# Patient Record
Sex: MALE | Race: WHITE | NOT HISPANIC OR LATINO | Employment: OTHER | URBAN - METROPOLITAN AREA
[De-identification: names, ages, dates, MRNs, and addresses within clinical notes are randomized per-mention and may not be internally consistent; named-entity substitution may affect disease eponyms.]

---

## 2021-03-21 ENCOUNTER — HOSPITAL ENCOUNTER (EMERGENCY)
Facility: HOSPITAL | Age: 46
Discharge: HOME | End: 2021-03-21
Attending: EMERGENCY MEDICINE
Payer: MEDICARE

## 2021-03-21 VITALS
DIASTOLIC BLOOD PRESSURE: 95 MMHG | BODY MASS INDEX: 32.85 KG/M2 | HEIGHT: 74 IN | RESPIRATION RATE: 16 BRPM | HEART RATE: 63 BPM | TEMPERATURE: 98 F | OXYGEN SATURATION: 97 % | SYSTOLIC BLOOD PRESSURE: 143 MMHG | WEIGHT: 256 LBS

## 2021-03-21 DIAGNOSIS — S83.91XD SPRAIN OF RIGHT KNEE, UNSPECIFIED LIGAMENT, SUBSEQUENT ENCOUNTER: Primary | ICD-10-CM

## 2021-03-21 DIAGNOSIS — M25.569 KNEE PAIN: ICD-10-CM

## 2021-03-21 PROCEDURE — 99283 EMERGENCY DEPT VISIT LOW MDM: CPT | Mod: 25

## 2021-10-29 ENCOUNTER — TELEPHONE (OUTPATIENT)
Dept: ORTHOPEDICS | Facility: CLINIC | Age: 46
End: 2021-10-29
Payer: MEDICARE

## 2021-10-29 DIAGNOSIS — M51.36 DDD (DEGENERATIVE DISC DISEASE), LUMBAR: Primary | ICD-10-CM

## 2021-11-01 ENCOUNTER — TELEPHONE (OUTPATIENT)
Dept: PAIN MEDICINE | Facility: CLINIC | Age: 46
End: 2021-11-01
Payer: MEDICARE

## 2021-11-01 ENCOUNTER — OFFICE VISIT (OUTPATIENT)
Dept: INTERNAL MEDICINE | Facility: CLINIC | Age: 46
End: 2021-11-01
Payer: MEDICARE

## 2021-11-01 VITALS
OXYGEN SATURATION: 97 % | DIASTOLIC BLOOD PRESSURE: 88 MMHG | HEIGHT: 74 IN | BODY MASS INDEX: 34.55 KG/M2 | TEMPERATURE: 99 F | HEART RATE: 85 BPM | SYSTOLIC BLOOD PRESSURE: 120 MMHG | WEIGHT: 269.19 LBS

## 2021-11-01 DIAGNOSIS — Z00.00 ANNUAL PHYSICAL EXAM: Primary | ICD-10-CM

## 2021-11-01 DIAGNOSIS — Z12.5 SCREENING FOR PROSTATE CANCER: ICD-10-CM

## 2021-11-01 DIAGNOSIS — Z11.59 NEED FOR HEPATITIS C SCREENING TEST: ICD-10-CM

## 2021-11-01 DIAGNOSIS — Z98.890 STATUS POST LUMBAR SPINE OPERATION: ICD-10-CM

## 2021-11-01 DIAGNOSIS — K76.9 LIVER LESION: ICD-10-CM

## 2021-11-01 DIAGNOSIS — F17.210 CIGARETTE NICOTINE DEPENDENCE WITHOUT COMPLICATION: ICD-10-CM

## 2021-11-01 DIAGNOSIS — Z79.899 HIGH RISK MEDICATION USE: ICD-10-CM

## 2021-11-01 PROCEDURE — 99999 PR PBB SHADOW E&M-EST. PATIENT-LVL III: CPT | Mod: PBBFAC,HCNC,, | Performed by: FAMILY MEDICINE

## 2021-11-01 PROCEDURE — 3079F PR MOST RECENT DIASTOLIC BLOOD PRESSURE 80-89 MM HG: ICD-10-PCS | Mod: HCNC,CPTII,S$GLB, | Performed by: FAMILY MEDICINE

## 2021-11-01 PROCEDURE — 99204 OFFICE O/P NEW MOD 45 MIN: CPT | Mod: HCNC,S$GLB,, | Performed by: FAMILY MEDICINE

## 2021-11-01 PROCEDURE — 99999 PR PBB SHADOW E&M-EST. PATIENT-LVL III: ICD-10-PCS | Mod: PBBFAC,HCNC,, | Performed by: FAMILY MEDICINE

## 2021-11-01 PROCEDURE — 3079F DIAST BP 80-89 MM HG: CPT | Mod: HCNC,CPTII,S$GLB, | Performed by: FAMILY MEDICINE

## 2021-11-01 PROCEDURE — 1159F PR MEDICATION LIST DOCUMENTED IN MEDICAL RECORD: ICD-10-PCS | Mod: HCNC,CPTII,S$GLB, | Performed by: FAMILY MEDICINE

## 2021-11-01 PROCEDURE — 1159F MED LIST DOCD IN RCRD: CPT | Mod: HCNC,CPTII,S$GLB, | Performed by: FAMILY MEDICINE

## 2021-11-01 PROCEDURE — 3074F SYST BP LT 130 MM HG: CPT | Mod: HCNC,CPTII,S$GLB, | Performed by: FAMILY MEDICINE

## 2021-11-01 PROCEDURE — 3074F PR MOST RECENT SYSTOLIC BLOOD PRESSURE < 130 MM HG: ICD-10-PCS | Mod: HCNC,CPTII,S$GLB, | Performed by: FAMILY MEDICINE

## 2021-11-01 PROCEDURE — 3008F PR BODY MASS INDEX (BMI) DOCUMENTED: ICD-10-PCS | Mod: HCNC,CPTII,S$GLB, | Performed by: FAMILY MEDICINE

## 2021-11-01 PROCEDURE — 3008F BODY MASS INDEX DOCD: CPT | Mod: HCNC,CPTII,S$GLB, | Performed by: FAMILY MEDICINE

## 2021-11-01 PROCEDURE — 99204 PR OFFICE/OUTPT VISIT, NEW, LEVL IV, 45-59 MIN: ICD-10-PCS | Mod: HCNC,S$GLB,, | Performed by: FAMILY MEDICINE

## 2021-11-05 ENCOUNTER — HOSPITAL ENCOUNTER (OUTPATIENT)
Dept: RADIOLOGY | Facility: HOSPITAL | Age: 46
Discharge: HOME | End: 2021-11-05
Attending: FAMILY MEDICINE
Payer: MEDICARE

## 2021-11-05 DIAGNOSIS — K76.9 LIVER LESION: ICD-10-CM

## 2021-11-05 PROCEDURE — 76705 ECHO EXAM OF ABDOMEN: CPT | Mod: 26,,, | Performed by: RADIOLOGY

## 2021-11-05 PROCEDURE — 76705 US ABDOMEN LIMITED_LIVER: ICD-10-PCS | Mod: 26,,, | Performed by: RADIOLOGY

## 2021-11-05 PROCEDURE — 76705 ECHO EXAM OF ABDOMEN: CPT | Mod: TC

## 2021-12-16 ENCOUNTER — TELEPHONE (OUTPATIENT)
Dept: PAIN MEDICINE | Facility: CLINIC | Age: 46
End: 2021-12-16
Payer: MEDICARE

## 2021-12-17 ENCOUNTER — OFFICE VISIT (OUTPATIENT)
Dept: PAIN MEDICINE | Facility: CLINIC | Age: 46
End: 2021-12-17
Payer: MEDICARE

## 2021-12-17 ENCOUNTER — HOSPITAL ENCOUNTER (OUTPATIENT)
Dept: RADIOLOGY | Facility: HOSPITAL | Age: 46
Discharge: HOME | End: 2021-12-17
Attending: ORTHOPAEDIC SURGERY
Payer: MEDICARE

## 2021-12-17 VITALS
BODY MASS INDEX: 31.35 KG/M2 | HEIGHT: 74 IN | HEART RATE: 87 BPM | SYSTOLIC BLOOD PRESSURE: 139 MMHG | RESPIRATION RATE: 17 BRPM | WEIGHT: 244.25 LBS | DIASTOLIC BLOOD PRESSURE: 90 MMHG

## 2021-12-17 DIAGNOSIS — M47.816 LUMBAR SPONDYLOSIS: ICD-10-CM

## 2021-12-17 DIAGNOSIS — M51.36 DDD (DEGENERATIVE DISC DISEASE), LUMBAR: ICD-10-CM

## 2021-12-17 DIAGNOSIS — Z98.890 STATUS POST LUMBAR SPINE OPERATION: ICD-10-CM

## 2021-12-17 DIAGNOSIS — M47.816 LUMBAR FACET ARTHROPATHY: ICD-10-CM

## 2021-12-17 DIAGNOSIS — M47.816 LUMBAR FACET ARTHROPATHY: Primary | ICD-10-CM

## 2021-12-17 PROCEDURE — 99999 PR PBB SHADOW E&M-EST. PATIENT-LVL III: ICD-10-PCS | Mod: PBBFAC,HCNC,, | Performed by: ANESTHESIOLOGY

## 2021-12-17 PROCEDURE — 99204 OFFICE O/P NEW MOD 45 MIN: CPT | Mod: HCNC,S$GLB,, | Performed by: ANESTHESIOLOGY

## 2021-12-17 PROCEDURE — 72110 XR LUMBAR SPINE 5 VIEW WITH FLEX AND EXT: ICD-10-PCS | Mod: 26,HCNC,, | Performed by: RADIOLOGY

## 2021-12-17 PROCEDURE — 99204 PR OFFICE/OUTPT VISIT, NEW, LEVL IV, 45-59 MIN: ICD-10-PCS | Mod: HCNC,S$GLB,, | Performed by: ANESTHESIOLOGY

## 2021-12-17 PROCEDURE — 72110 X-RAY EXAM L-2 SPINE 4/>VWS: CPT | Mod: TC,HCNC

## 2021-12-17 PROCEDURE — 99999 PR PBB SHADOW E&M-EST. PATIENT-LVL III: CPT | Mod: PBBFAC,HCNC,, | Performed by: ANESTHESIOLOGY

## 2021-12-17 PROCEDURE — 72110 X-RAY EXAM L-2 SPINE 4/>VWS: CPT | Mod: 26,HCNC,, | Performed by: RADIOLOGY

## 2022-01-31 PROBLEM — Z00.00 ANNUAL PHYSICAL EXAM: Status: RESOLVED | Noted: 2021-11-01 | Resolved: 2022-01-31

## 2022-03-02 ENCOUNTER — OFFICE VISIT (OUTPATIENT)
Dept: INTERNAL MEDICINE | Facility: CLINIC | Age: 47
End: 2022-03-02
Payer: MEDICARE

## 2022-03-02 ENCOUNTER — LAB VISIT (OUTPATIENT)
Dept: LAB | Facility: HOSPITAL | Age: 47
End: 2022-03-02
Attending: FAMILY MEDICINE
Payer: MEDICARE

## 2022-03-02 VITALS
WEIGHT: 258.63 LBS | HEART RATE: 72 BPM | RESPIRATION RATE: 18 BRPM | BODY MASS INDEX: 33.19 KG/M2 | HEIGHT: 74 IN | OXYGEN SATURATION: 95 % | DIASTOLIC BLOOD PRESSURE: 82 MMHG | SYSTOLIC BLOOD PRESSURE: 134 MMHG | TEMPERATURE: 97 F

## 2022-03-02 DIAGNOSIS — E78.1 HYPERTRIGLYCERIDEMIA: ICD-10-CM

## 2022-03-02 DIAGNOSIS — K74.60 HEPATIC CIRRHOSIS, UNSPECIFIED HEPATIC CIRRHOSIS TYPE, UNSPECIFIED WHETHER ASCITES PRESENT: ICD-10-CM

## 2022-03-02 DIAGNOSIS — K74.60 HEPATIC CIRRHOSIS, UNSPECIFIED HEPATIC CIRRHOSIS TYPE, UNSPECIFIED WHETHER ASCITES PRESENT: Primary | ICD-10-CM

## 2022-03-02 LAB
ESTIMATED AVG GLUCOSE: 114 MG/DL (ref 68–131)
HBA1C MFR BLD: 5.6 % (ref 4–5.6)

## 2022-03-02 PROCEDURE — 3075F SYST BP GE 130 - 139MM HG: CPT | Mod: CPTII,S$GLB,, | Performed by: FAMILY MEDICINE

## 2022-03-02 PROCEDURE — 3075F PR MOST RECENT SYSTOLIC BLOOD PRESS GE 130-139MM HG: ICD-10-PCS | Mod: CPTII,S$GLB,, | Performed by: FAMILY MEDICINE

## 2022-03-02 PROCEDURE — 83036 HEMOGLOBIN GLYCOSYLATED A1C: CPT | Performed by: FAMILY MEDICINE

## 2022-03-02 PROCEDURE — 3008F PR BODY MASS INDEX (BMI) DOCUMENTED: ICD-10-PCS | Mod: CPTII,S$GLB,, | Performed by: FAMILY MEDICINE

## 2022-03-02 PROCEDURE — 3008F BODY MASS INDEX DOCD: CPT | Mod: CPTII,S$GLB,, | Performed by: FAMILY MEDICINE

## 2022-03-02 PROCEDURE — 3079F PR MOST RECENT DIASTOLIC BLOOD PRESSURE 80-89 MM HG: ICD-10-PCS | Mod: CPTII,S$GLB,, | Performed by: FAMILY MEDICINE

## 2022-03-02 PROCEDURE — 99999 PR PBB SHADOW E&M-EST. PATIENT-LVL III: CPT | Mod: PBBFAC,,, | Performed by: FAMILY MEDICINE

## 2022-03-02 PROCEDURE — 99499 UNLISTED E&M SERVICE: CPT | Mod: S$GLB,,, | Performed by: FAMILY MEDICINE

## 2022-03-02 PROCEDURE — 99499 RISK ADDL DX/OHS AUDIT: ICD-10-PCS | Mod: S$GLB,,, | Performed by: FAMILY MEDICINE

## 2022-03-02 PROCEDURE — 99214 PR OFFICE/OUTPT VISIT, EST, LEVL IV, 30-39 MIN: ICD-10-PCS | Mod: S$GLB,,, | Performed by: FAMILY MEDICINE

## 2022-03-02 PROCEDURE — 1159F PR MEDICATION LIST DOCUMENTED IN MEDICAL RECORD: ICD-10-PCS | Mod: CPTII,S$GLB,, | Performed by: FAMILY MEDICINE

## 2022-03-02 PROCEDURE — 86706 HEP B SURFACE ANTIBODY: CPT | Performed by: FAMILY MEDICINE

## 2022-03-02 PROCEDURE — 99214 OFFICE O/P EST MOD 30 MIN: CPT | Mod: S$GLB,,, | Performed by: FAMILY MEDICINE

## 2022-03-02 PROCEDURE — 99999 PR PBB SHADOW E&M-EST. PATIENT-LVL III: ICD-10-PCS | Mod: PBBFAC,,, | Performed by: FAMILY MEDICINE

## 2022-03-02 PROCEDURE — 1159F MED LIST DOCD IN RCRD: CPT | Mod: CPTII,S$GLB,, | Performed by: FAMILY MEDICINE

## 2022-03-02 PROCEDURE — 3079F DIAST BP 80-89 MM HG: CPT | Mod: CPTII,S$GLB,, | Performed by: FAMILY MEDICINE

## 2022-03-02 NOTE — PROGRESS NOTES
Subjective:       Patient ID: Jean Marie Lester is a 46 y.o. male.    Chief Complaint: Follow-up    Three-month follow-up.  He has been followed by pain management for chronic back pain.  Recent lab mildly elevated glucose and triglyceride.  He improved his diet since lab was done.  Ultrasound of the liver suggestive cirrhosis.  Hepatitis C test was negative.  Denies nausea vomiting abdominal pain dark urine.  He currently is not drinking alcohol.  He reports that years ago he drink alcohol in excess.    Review of Systems   Constitutional: Negative for activity change, appetite change, chills and fever.   Respiratory: Negative for cough, chest tightness, shortness of breath and wheezing.    Cardiovascular: Negative for chest pain, palpitations and leg swelling.   Gastrointestinal: Negative for abdominal distention, abdominal pain, constipation, diarrhea, nausea and vomiting.   Endocrine: Negative for polydipsia and polyuria.   Musculoskeletal: Positive for back pain.       Objective:      Physical Exam  Constitutional:       General: He is not in acute distress.     Appearance: He is not ill-appearing or diaphoretic.   Cardiovascular:      Rate and Rhythm: Normal rate and regular rhythm.      Heart sounds: No murmur heard.    No gallop.   Pulmonary:      Effort: Pulmonary effort is normal. No respiratory distress.      Breath sounds: No wheezing, rhonchi or rales.   Abdominal:      General: There is no distension.      Palpations: Abdomen is soft. There is no mass.      Tenderness: There is no abdominal tenderness.   Lymphadenopathy:      Cervical: No cervical adenopathy.   Neurological:      Mental Status: He is alert and oriented to person, place, and time.         Lab Visit on 11/05/2021   Component Date Value Ref Range Status    WBC 11/05/2021 11.59  3.90 - 12.70 K/uL Final    RBC 11/05/2021 5.95  4.60 - 6.20 M/uL Final    Hemoglobin 11/05/2021 17.5  14.0 - 18.0 g/dL Final    Hematocrit 11/05/2021 55.2 (A)  40.0 - 54.0 % Final    MCV 11/05/2021 93  82 - 98 fL Final    MCH 11/05/2021 29.4  27.0 - 31.0 pg Final    MCHC 11/05/2021 31.7 (A) 32.0 - 36.0 g/dL Final    RDW 11/05/2021 13.3  11.5 - 14.5 % Final    Platelets 11/05/2021 292  150 - 450 K/uL Final    MPV 11/05/2021 10.2  9.2 - 12.9 fL Final    Immature Granulocytes 11/05/2021 0.3  0.0 - 0.5 % Final    Gran # (ANC) 11/05/2021 7.0  1.8 - 7.7 K/uL Final    Immature Grans (Abs) 11/05/2021 0.04  0.00 - 0.04 K/uL Final    Lymph # 11/05/2021 3.6  1.0 - 4.8 K/uL Final    Mono # 11/05/2021 0.7  0.3 - 1.0 K/uL Final    Eos # 11/05/2021 0.2  0.0 - 0.5 K/uL Final    Baso # 11/05/2021 0.08  0.00 - 0.20 K/uL Final    nRBC 11/05/2021 0  0 /100 WBC Final    Gran % 11/05/2021 60.3  38.0 - 73.0 % Final    Lymph % 11/05/2021 30.9  18.0 - 48.0 % Final    Mono % 11/05/2021 6.3  4.0 - 15.0 % Final    Eosinophil % 11/05/2021 1.5  0.0 - 8.0 % Final    Basophil % 11/05/2021 0.7  0.0 - 1.9 % Final    Differential Method 11/05/2021 Automated   Final    Sodium 11/05/2021 139  136 - 145 mmol/L Final    Potassium 11/05/2021 4.8  3.5 - 5.1 mmol/L Final    Chloride 11/05/2021 104  95 - 110 mmol/L Final    CO2 11/05/2021 26  23 - 29 mmol/L Final    Glucose 11/05/2021 114 (A) 70 - 110 mg/dL Final    BUN 11/05/2021 10  6 - 20 mg/dL Final    Creatinine 11/05/2021 1.0  0.5 - 1.4 mg/dL Final    Calcium 11/05/2021 9.8  8.7 - 10.5 mg/dL Final    Total Protein 11/05/2021 8.0  6.0 - 8.4 g/dL Final    Albumin 11/05/2021 4.1  3.5 - 5.2 g/dL Final    Total Bilirubin 11/05/2021 0.6  0.1 - 1.0 mg/dL Final    Alkaline Phosphatase 11/05/2021 61  55 - 135 U/L Final    AST 11/05/2021 18  10 - 40 U/L Final    ALT 11/05/2021 24  10 - 44 U/L Final    Anion Gap 11/05/2021 9  8 - 16 mmol/L Final    eGFR if African American 11/05/2021 >60.0  >60 mL/min/1.73 m^2 Final    eGFR if non African American 11/05/2021 >60.0  >60 mL/min/1.73 m^2 Final    Cholesterol 11/05/2021 199  120 - 199  mg/dL Final    Triglycerides 11/05/2021 182 (A) 30 - 150 mg/dL Final    HDL 11/05/2021 35 (A) 40 - 75 mg/dL Final    LDL Cholesterol 11/05/2021 127.6  63.0 - 159.0 mg/dL Final    HDL/Cholesterol Ratio 11/05/2021 17.6 (A) 20.0 - 50.0 % Final    Total Cholesterol/HDL Ratio 11/05/2021 5.7 (A) 2.0 - 5.0 Final    Non-HDL Cholesterol 11/05/2021 164  mg/dL Final    TSH 11/05/2021 2.556  0.400 - 4.000 uIU/mL Final    Hepatitis C Ab 11/05/2021 Negative  Negative Final     Assessment:       1. Hepatic cirrhosis, unspecified hepatic cirrhosis type, unspecified whether ascites present    2. Hypertriglyceridemia        Plan:     A1c hepatitis b antibody ordered.  GI referral.  Follow-up in 3 months.    Hepatic cirrhosis, unspecified hepatic cirrhosis type, unspecified whether ascites present  -     Ambulatory referral/consult to Gastroenterology; Future; Expected date: 03/09/2022  -     Hepatitis B Surface Antibody, Qual/Quant; Future; Expected date: 03/02/2022    Hypertriglyceridemia  -     Hemoglobin A1C; Future; Expected date: 03/02/2022

## 2022-03-05 LAB
HBV SURFACE AB SER QL IA: NEGATIVE
HBV SURFACE AB SERPL IA-ACNC: <3 MIU/ML

## 2022-03-08 ENCOUNTER — TELEPHONE (OUTPATIENT)
Dept: GASTROENTEROLOGY | Facility: CLINIC | Age: 47
End: 2022-03-08
Payer: MEDICARE

## 2022-04-05 ENCOUNTER — OFFICE VISIT (OUTPATIENT)
Dept: HEPATOLOGY | Facility: CLINIC | Age: 47
End: 2022-04-05
Payer: MEDICARE

## 2022-04-05 ENCOUNTER — LAB VISIT (OUTPATIENT)
Dept: LAB | Facility: HOSPITAL | Age: 47
End: 2022-04-05
Attending: INTERNAL MEDICINE
Payer: MEDICARE

## 2022-04-05 VITALS — BODY MASS INDEX: 32.82 KG/M2 | WEIGHT: 255.75 LBS | HEIGHT: 74 IN | HEART RATE: 78 BPM

## 2022-04-05 DIAGNOSIS — R93.2 ABNORMAL LIVER DIAGNOSTIC IMAGING: Primary | ICD-10-CM

## 2022-04-05 DIAGNOSIS — K74.60 HEPATIC CIRRHOSIS, UNSPECIFIED HEPATIC CIRRHOSIS TYPE, UNSPECIFIED WHETHER ASCITES PRESENT: ICD-10-CM

## 2022-04-05 DIAGNOSIS — R93.2 ABNORMAL LIVER DIAGNOSTIC IMAGING: ICD-10-CM

## 2022-04-05 LAB
AFP SERPL-MCNC: 2.7 NG/ML (ref 0–8.4)
ALBUMIN SERPL BCP-MCNC: 4 G/DL (ref 3.5–5.2)
ALP SERPL-CCNC: 60 U/L (ref 55–135)
ALT SERPL W/O P-5'-P-CCNC: 27 U/L (ref 10–44)
ANION GAP SERPL CALC-SCNC: 13 MMOL/L (ref 8–16)
AST SERPL-CCNC: 20 U/L (ref 10–40)
BILIRUB SERPL-MCNC: 0.5 MG/DL (ref 0.1–1)
BUN SERPL-MCNC: 11 MG/DL (ref 6–20)
CALCIUM SERPL-MCNC: 9.4 MG/DL (ref 8.7–10.5)
CHLORIDE SERPL-SCNC: 102 MMOL/L (ref 95–110)
CO2 SERPL-SCNC: 25 MMOL/L (ref 23–29)
CREAT SERPL-MCNC: 1 MG/DL (ref 0.5–1.4)
EST. GFR  (AFRICAN AMERICAN): >60 ML/MIN/1.73 M^2
EST. GFR  (NON AFRICAN AMERICAN): >60 ML/MIN/1.73 M^2
FERRITIN SERPL-MCNC: 508 NG/ML (ref 20–300)
GLUCOSE SERPL-MCNC: 118 MG/DL (ref 70–110)
IGA SERPL-MCNC: 337 MG/DL (ref 40–350)
IGG SERPL-MCNC: 1330 MG/DL (ref 650–1600)
IGM SERPL-MCNC: 37 MG/DL (ref 50–300)
INR PPP: 1 (ref 0.8–1.2)
POTASSIUM SERPL-SCNC: 3.6 MMOL/L (ref 3.5–5.1)
PROT SERPL-MCNC: 7.7 G/DL (ref 6–8.4)
PROTHROMBIN TIME: 10.9 SEC (ref 9–12.5)
SODIUM SERPL-SCNC: 140 MMOL/L (ref 136–145)

## 2022-04-05 PROCEDURE — 99205 PR OFFICE/OUTPT VISIT, NEW, LEVL V, 60-74 MIN: ICD-10-PCS | Mod: S$GLB,,, | Performed by: INTERNAL MEDICINE

## 2022-04-05 PROCEDURE — 85610 PROTHROMBIN TIME: CPT | Performed by: INTERNAL MEDICINE

## 2022-04-05 PROCEDURE — 3008F BODY MASS INDEX DOCD: CPT | Mod: CPTII,S$GLB,, | Performed by: INTERNAL MEDICINE

## 2022-04-05 PROCEDURE — 86038 ANTINUCLEAR ANTIBODIES: CPT | Performed by: INTERNAL MEDICINE

## 2022-04-05 PROCEDURE — 86790 VIRUS ANTIBODY NOS: CPT | Performed by: INTERNAL MEDICINE

## 2022-04-05 PROCEDURE — 1159F PR MEDICATION LIST DOCUMENTED IN MEDICAL RECORD: ICD-10-PCS | Mod: CPTII,S$GLB,, | Performed by: INTERNAL MEDICINE

## 2022-04-05 PROCEDURE — 86256 FLUORESCENT ANTIBODY TITER: CPT | Mod: 91 | Performed by: INTERNAL MEDICINE

## 2022-04-05 PROCEDURE — 82784 ASSAY IGA/IGD/IGG/IGM EACH: CPT | Mod: 59 | Performed by: INTERNAL MEDICINE

## 2022-04-05 PROCEDURE — 82105 ALPHA-FETOPROTEIN SERUM: CPT | Performed by: INTERNAL MEDICINE

## 2022-04-05 PROCEDURE — 99999 PR PBB SHADOW E&M-EST. PATIENT-LVL III: CPT | Mod: PBBFAC,,, | Performed by: INTERNAL MEDICINE

## 2022-04-05 PROCEDURE — 86235 NUCLEAR ANTIGEN ANTIBODY: CPT | Performed by: INTERNAL MEDICINE

## 2022-04-05 PROCEDURE — 1160F RVW MEDS BY RX/DR IN RCRD: CPT | Mod: CPTII,S$GLB,, | Performed by: INTERNAL MEDICINE

## 2022-04-05 PROCEDURE — 1159F MED LIST DOCD IN RCRD: CPT | Mod: CPTII,S$GLB,, | Performed by: INTERNAL MEDICINE

## 2022-04-05 PROCEDURE — 3044F HG A1C LEVEL LT 7.0%: CPT | Mod: CPTII,S$GLB,, | Performed by: INTERNAL MEDICINE

## 2022-04-05 PROCEDURE — 99999 PR PBB SHADOW E&M-EST. PATIENT-LVL III: ICD-10-PCS | Mod: PBBFAC,,, | Performed by: INTERNAL MEDICINE

## 2022-04-05 PROCEDURE — 82728 ASSAY OF FERRITIN: CPT | Performed by: INTERNAL MEDICINE

## 2022-04-05 PROCEDURE — 86706 HEP B SURFACE ANTIBODY: CPT | Performed by: INTERNAL MEDICINE

## 2022-04-05 PROCEDURE — 3008F PR BODY MASS INDEX (BMI) DOCUMENTED: ICD-10-PCS | Mod: CPTII,S$GLB,, | Performed by: INTERNAL MEDICINE

## 2022-04-05 PROCEDURE — 3044F PR MOST RECENT HEMOGLOBIN A1C LEVEL <7.0%: ICD-10-PCS | Mod: CPTII,S$GLB,, | Performed by: INTERNAL MEDICINE

## 2022-04-05 PROCEDURE — 82390 ASSAY OF CERULOPLASMIN: CPT | Performed by: INTERNAL MEDICINE

## 2022-04-05 PROCEDURE — 80053 COMPREHEN METABOLIC PANEL: CPT | Performed by: INTERNAL MEDICINE

## 2022-04-05 PROCEDURE — 99205 OFFICE O/P NEW HI 60 MIN: CPT | Mod: S$GLB,,, | Performed by: INTERNAL MEDICINE

## 2022-04-05 PROCEDURE — 36415 COLL VENOUS BLD VENIPUNCTURE: CPT | Performed by: INTERNAL MEDICINE

## 2022-04-05 PROCEDURE — 1160F PR REVIEW ALL MEDS BY PRESCRIBER/CLIN PHARMACIST DOCUMENTED: ICD-10-PCS | Mod: CPTII,S$GLB,, | Performed by: INTERNAL MEDICINE

## 2022-04-05 PROCEDURE — 82103 ALPHA-1-ANTITRYPSIN TOTAL: CPT | Performed by: INTERNAL MEDICINE

## 2022-04-05 PROCEDURE — 86376 MICROSOMAL ANTIBODY EACH: CPT | Performed by: INTERNAL MEDICINE

## 2022-04-05 PROCEDURE — 87340 HEPATITIS B SURFACE AG IA: CPT | Performed by: INTERNAL MEDICINE

## 2022-04-05 PROCEDURE — 80321 ALCOHOLS BIOMARKERS 1OR 2: CPT | Performed by: INTERNAL MEDICINE
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
## 2022-04-06 LAB
A1AT SERPL-MCNC: 130 MG/DL (ref 100–190)
ANA SER QL IF: NORMAL
CERULOPLASMIN SERPL-MCNC: 25 MG/DL (ref 15–45)
HAV IGG SER QL IA: NEGATIVE
HBV SURFACE AB SER-ACNC: NEGATIVE M[IU]/ML
HBV SURFACE AG SERPL QL IA: NEGATIVE
MITOCHONDRIA AB TITR SER IF: NORMAL {TITER}
SMOOTH MUSCLE AB TITR SER IF: NORMAL {TITER}

## 2022-04-08 LAB
LKM AB SER-ACNC: 3.9 UNITS
PETH 16:0/18.1 (POPETH): 34 NG/ML
PETH 16:0/18.2 (PLPETH): 26 NG/ML

## 2022-04-20 ENCOUNTER — HOSPITAL ENCOUNTER (OUTPATIENT)
Dept: RADIOLOGY | Facility: HOSPITAL | Age: 47
Discharge: HOME | End: 2022-04-20
Attending: INTERNAL MEDICINE
Payer: MEDICARE

## 2022-04-20 DIAGNOSIS — R93.2 ABNORMAL LIVER DIAGNOSTIC IMAGING: ICD-10-CM

## 2022-04-20 DIAGNOSIS — K74.60 HEPATIC CIRRHOSIS, UNSPECIFIED HEPATIC CIRRHOSIS TYPE, UNSPECIFIED WHETHER ASCITES PRESENT: ICD-10-CM

## 2022-04-20 PROCEDURE — 25500020 PHARM REV CODE 255: Performed by: INTERNAL MEDICINE

## 2022-04-20 PROCEDURE — 74170 CT ABD WO CNTRST FLWD CNTRST: CPT | Mod: TC

## 2022-04-20 RX ADMIN — IOHEXOL 100 ML: 350 INJECTION, SOLUTION INTRAVENOUS at 11:04

## 2022-05-18 ENCOUNTER — OFFICE VISIT (OUTPATIENT)
Dept: HEPATOLOGY | Facility: CLINIC | Age: 47
End: 2022-05-18
Payer: MEDICARE

## 2022-05-18 VITALS
SYSTOLIC BLOOD PRESSURE: 128 MMHG | HEIGHT: 74 IN | HEART RATE: 92 BPM | WEIGHT: 253.75 LBS | BODY MASS INDEX: 32.57 KG/M2 | DIASTOLIC BLOOD PRESSURE: 90 MMHG

## 2022-05-18 DIAGNOSIS — R93.2 ABNORMAL LIVER DIAGNOSTIC IMAGING: Primary | ICD-10-CM

## 2022-05-18 PROCEDURE — 99999 PR PBB SHADOW E&M-EST. PATIENT-LVL II: CPT | Mod: PBBFAC,,, | Performed by: INTERNAL MEDICINE

## 2022-05-18 PROCEDURE — 3080F DIAST BP >= 90 MM HG: CPT | Mod: CPTII,S$GLB,, | Performed by: INTERNAL MEDICINE

## 2022-05-18 PROCEDURE — 3080F PR MOST RECENT DIASTOLIC BLOOD PRESSURE >= 90 MM HG: ICD-10-PCS | Mod: CPTII,S$GLB,, | Performed by: INTERNAL MEDICINE

## 2022-05-18 PROCEDURE — 3044F PR MOST RECENT HEMOGLOBIN A1C LEVEL <7.0%: ICD-10-PCS | Mod: CPTII,S$GLB,, | Performed by: INTERNAL MEDICINE

## 2022-05-18 PROCEDURE — 3074F PR MOST RECENT SYSTOLIC BLOOD PRESSURE < 130 MM HG: ICD-10-PCS | Mod: CPTII,S$GLB,, | Performed by: INTERNAL MEDICINE

## 2022-05-18 PROCEDURE — 99999 PR PBB SHADOW E&M-EST. PATIENT-LVL II: ICD-10-PCS | Mod: PBBFAC,,, | Performed by: INTERNAL MEDICINE

## 2022-05-18 PROCEDURE — 3044F HG A1C LEVEL LT 7.0%: CPT | Mod: CPTII,S$GLB,, | Performed by: INTERNAL MEDICINE

## 2022-05-18 PROCEDURE — 3008F BODY MASS INDEX DOCD: CPT | Mod: CPTII,S$GLB,, | Performed by: INTERNAL MEDICINE

## 2022-05-18 PROCEDURE — 1159F PR MEDICATION LIST DOCUMENTED IN MEDICAL RECORD: ICD-10-PCS | Mod: CPTII,S$GLB,, | Performed by: INTERNAL MEDICINE

## 2022-05-18 PROCEDURE — 3074F SYST BP LT 130 MM HG: CPT | Mod: CPTII,S$GLB,, | Performed by: INTERNAL MEDICINE

## 2022-05-18 PROCEDURE — 3008F PR BODY MASS INDEX (BMI) DOCUMENTED: ICD-10-PCS | Mod: CPTII,S$GLB,, | Performed by: INTERNAL MEDICINE

## 2022-05-18 PROCEDURE — 99214 OFFICE O/P EST MOD 30 MIN: CPT | Mod: S$GLB,,, | Performed by: INTERNAL MEDICINE

## 2022-05-18 PROCEDURE — 1159F MED LIST DOCD IN RCRD: CPT | Mod: CPTII,S$GLB,, | Performed by: INTERNAL MEDICINE

## 2022-05-18 PROCEDURE — 99214 PR OFFICE/OUTPT VISIT, EST, LEVL IV, 30-39 MIN: ICD-10-PCS | Mod: S$GLB,,, | Performed by: INTERNAL MEDICINE
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
## 2022-05-26 ENCOUNTER — TELEPHONE (OUTPATIENT)
Dept: HEPATOLOGY | Facility: CLINIC | Age: 47
End: 2022-05-26
Payer: MEDICARE

## 2022-05-26 NOTE — TELEPHONE ENCOUNTER
----- Message from Flash Valencia MA sent at 5/26/2022 12:38 PM CDT -----  Can you call and schedule his fibroscan   ----- Message -----  From: Yamel Cordon  Sent: 5/26/2022   8:35 AM CDT  To: [facility] Staff    .Type:  Needs Medical Advice    Who Called: GEETHA GRAVES [[id number]]  Symptoms (please be specific):   How long has patient had these symptoms:    Pharmacy name and phone #:    Would the patient rather a call back or a response via [facility]?   Best Call Back Number:  [phone]  Additional Information: Pt is ready to r/s  fibroscan. Please call

## 2022-06-03 ENCOUNTER — PROCEDURE VISIT (OUTPATIENT)
Dept: HEPATOLOGY | Facility: CLINIC | Age: 47
End: 2022-06-03
Attending: INTERNAL MEDICINE
Payer: MEDICARE

## 2022-06-03 ENCOUNTER — LAB VISIT (OUTPATIENT)
Dept: LAB | Facility: HOSPITAL | Age: 47
End: 2022-06-03
Attending: INTERNAL MEDICINE
Payer: MEDICARE

## 2022-06-03 VITALS — BODY MASS INDEX: 33.1 KG/M2 | HEIGHT: 74 IN | WEIGHT: 257.94 LBS

## 2022-06-03 DIAGNOSIS — R93.2 ABNORMAL LIVER DIAGNOSTIC IMAGING: ICD-10-CM

## 2022-06-03 LAB
ALBUMIN SERPL BCP-MCNC: 3.9 G/DL (ref 3.5–5.2)
ALP SERPL-CCNC: 52 U/L (ref 55–135)
ALT SERPL W/O P-5'-P-CCNC: 20 U/L (ref 10–44)
ANION GAP SERPL CALC-SCNC: 8 MMOL/L (ref 8–16)
AST SERPL-CCNC: 14 U/L (ref 10–40)
BILIRUB SERPL-MCNC: 0.4 MG/DL (ref 0.1–1)
BUN SERPL-MCNC: 13 MG/DL (ref 6–20)
CALCIUM SERPL-MCNC: 9.1 MG/DL (ref 8.7–10.5)
CHLORIDE SERPL-SCNC: 107 MMOL/L (ref 95–110)
CO2 SERPL-SCNC: 26 MMOL/L (ref 23–29)
CREAT SERPL-MCNC: 0.8 MG/DL (ref 0.5–1.4)
EST. GFR  (AFRICAN AMERICAN): >60 ML/MIN/1.73 M^2
EST. GFR  (NON AFRICAN AMERICAN): >60 ML/MIN/1.73 M^2
GLUCOSE SERPL-MCNC: 92 MG/DL (ref 70–110)
POTASSIUM SERPL-SCNC: 4.8 MMOL/L (ref 3.5–5.1)
PROT SERPL-MCNC: 6.9 G/DL (ref 6–8.4)
SODIUM SERPL-SCNC: 141 MMOL/L (ref 136–145)

## 2022-06-03 PROCEDURE — 36415 COLL VENOUS BLD VENIPUNCTURE: CPT | Performed by: INTERNAL MEDICINE

## 2022-06-03 PROCEDURE — 80053 COMPREHEN METABOLIC PANEL: CPT | Performed by: INTERNAL MEDICINE

## 2022-06-06 NOTE — PROCEDURES
Fibroscan Procedure     Name: Jean Marie Lester  Date of Procedure : 2022   :: Jada Leblanc MD  Diagnosis: other    Probe: XL    Fibroscan readin.0  KPa    Fibrosis:F 0-1     CAP reading: S1 dB/m    Steatosis: :S1       Interpretation: Mild steatosis, mild to no fibrosis

## 2022-06-13 ENCOUNTER — PATIENT MESSAGE (OUTPATIENT)
Dept: CARDIOLOGY | Facility: CLINIC | Age: 47
End: 2022-06-13
Payer: MEDICARE

## 2022-06-13 ENCOUNTER — TELEPHONE (OUTPATIENT)
Dept: CARDIOLOGY | Facility: CLINIC | Age: 47
End: 2022-06-13
Payer: MEDICARE

## 2022-06-13 NOTE — TELEPHONE ENCOUNTER
----- Message from Jada Leblanc MD sent at 6/5/2022  9:28 PM CDT -----  Fibroscan shows Mild steatosis, mild to no fibrosis

## 2022-09-21 ENCOUNTER — TELEPHONE (OUTPATIENT)
Dept: INTERNAL MEDICINE | Facility: CLINIC | Age: 47
End: 2022-09-21
Payer: MEDICARE

## 2022-09-22 ENCOUNTER — TELEPHONE (OUTPATIENT)
Dept: PAIN MEDICINE | Facility: CLINIC | Age: 47
End: 2022-09-22
Payer: MEDICARE

## 2022-09-22 ENCOUNTER — OFFICE VISIT (OUTPATIENT)
Dept: INTERNAL MEDICINE | Facility: CLINIC | Age: 47
End: 2022-09-22
Payer: MEDICARE

## 2022-09-22 VITALS
DIASTOLIC BLOOD PRESSURE: 88 MMHG | SYSTOLIC BLOOD PRESSURE: 128 MMHG | TEMPERATURE: 98 F | OXYGEN SATURATION: 96 % | WEIGHT: 259.94 LBS | HEART RATE: 76 BPM | BODY MASS INDEX: 33.37 KG/M2 | RESPIRATION RATE: 20 BRPM

## 2022-09-22 DIAGNOSIS — Z98.890 STATUS POST LUMBAR SPINE OPERATION: ICD-10-CM

## 2022-09-22 DIAGNOSIS — K76.9 LIVER LESION: Primary | ICD-10-CM

## 2022-09-22 DIAGNOSIS — G89.29 CHRONIC LOW BACK PAIN WITHOUT SCIATICA, UNSPECIFIED BACK PAIN LATERALITY: ICD-10-CM

## 2022-09-22 DIAGNOSIS — Z12.11 COLON CANCER SCREENING: ICD-10-CM

## 2022-09-22 DIAGNOSIS — M54.50 CHRONIC LOW BACK PAIN WITHOUT SCIATICA, UNSPECIFIED BACK PAIN LATERALITY: ICD-10-CM

## 2022-09-22 PROCEDURE — 3008F PR BODY MASS INDEX (BMI) DOCUMENTED: ICD-10-PCS | Mod: CPTII,S$GLB,, | Performed by: FAMILY MEDICINE

## 2022-09-22 PROCEDURE — 3074F PR MOST RECENT SYSTOLIC BLOOD PRESSURE < 130 MM HG: ICD-10-PCS | Mod: CPTII,S$GLB,, | Performed by: FAMILY MEDICINE

## 2022-09-22 PROCEDURE — 3079F DIAST BP 80-89 MM HG: CPT | Mod: CPTII,S$GLB,, | Performed by: FAMILY MEDICINE

## 2022-09-22 PROCEDURE — 99999 PR PBB SHADOW E&M-EST. PATIENT-LVL III: ICD-10-PCS | Mod: PBBFAC,,, | Performed by: FAMILY MEDICINE

## 2022-09-22 PROCEDURE — 99214 PR OFFICE/OUTPT VISIT, EST, LEVL IV, 30-39 MIN: ICD-10-PCS | Mod: S$GLB,,, | Performed by: FAMILY MEDICINE

## 2022-09-22 PROCEDURE — 1159F MED LIST DOCD IN RCRD: CPT | Mod: CPTII,S$GLB,, | Performed by: FAMILY MEDICINE

## 2022-09-22 PROCEDURE — 3044F PR MOST RECENT HEMOGLOBIN A1C LEVEL <7.0%: ICD-10-PCS | Mod: CPTII,S$GLB,, | Performed by: FAMILY MEDICINE

## 2022-09-22 PROCEDURE — 99999 PR PBB SHADOW E&M-EST. PATIENT-LVL III: CPT | Mod: PBBFAC,,, | Performed by: FAMILY MEDICINE

## 2022-09-22 PROCEDURE — 3074F SYST BP LT 130 MM HG: CPT | Mod: CPTII,S$GLB,, | Performed by: FAMILY MEDICINE

## 2022-09-22 PROCEDURE — 3079F PR MOST RECENT DIASTOLIC BLOOD PRESSURE 80-89 MM HG: ICD-10-PCS | Mod: CPTII,S$GLB,, | Performed by: FAMILY MEDICINE

## 2022-09-22 PROCEDURE — 99214 OFFICE O/P EST MOD 30 MIN: CPT | Mod: S$GLB,,, | Performed by: FAMILY MEDICINE

## 2022-09-22 PROCEDURE — 1159F PR MEDICATION LIST DOCUMENTED IN MEDICAL RECORD: ICD-10-PCS | Mod: CPTII,S$GLB,, | Performed by: FAMILY MEDICINE

## 2022-09-22 PROCEDURE — 3044F HG A1C LEVEL LT 7.0%: CPT | Mod: CPTII,S$GLB,, | Performed by: FAMILY MEDICINE

## 2022-09-22 PROCEDURE — 3008F BODY MASS INDEX DOCD: CPT | Mod: CPTII,S$GLB,, | Performed by: FAMILY MEDICINE

## 2022-09-22 NOTE — PROGRESS NOTES
"Subjective:       Patient ID: Jean Marie Lester is a 47 y.o. male.    Chief Complaint: Follow-up (Patient presents for three month follow-up. LOV in March of 2022 addressed hepatic cirrhosis and hypertriglyceridemia. He reports that on Yesica 3 of this year, he had a scan done with Dr. Leblanc where he was told everything was "ok." //No new issues since last visit. He needs a referral to another specialist for his back. )    Follow-up chronic back pain possible liver cirrhosis.  Hepatology evaluation reviewed.  He is on a weight reducing diet.  He is no longer using opiates.  He is using medical marijuana about 3 times a day for back pain.  He reports this is helping he no longer has constipation.  He denies chest pain palpitations shortness of breath or edema.  He would like pain management referral to establish care.  He reports he wants to establish care in case he needs back injection in the future    Review of Systems   Constitutional:  Negative for appetite change, chills, fever and unexpected weight change.   HENT:  Negative for congestion.    Respiratory:  Negative for cough, chest tightness, shortness of breath and wheezing.    Gastrointestinal:  Negative for abdominal distention, abdominal pain, constipation and nausea.   Musculoskeletal:  Positive for back pain.   Neurological:  Negative for dizziness, weakness, light-headedness and headaches.   Psychiatric/Behavioral:  Negative for dysphoric mood. The patient is not nervous/anxious.      Objective:      Physical Exam  Constitutional:       General: He is not in acute distress.     Appearance: He is not ill-appearing or diaphoretic.   Cardiovascular:      Rate and Rhythm: Normal rate and regular rhythm.      Heart sounds: No murmur heard.    No gallop.   Pulmonary:      Effort: Pulmonary effort is normal. No respiratory distress.      Breath sounds: No wheezing, rhonchi or rales.   Abdominal:      General: There is no distension.      Palpations: Abdomen " is soft. There is no mass.      Tenderness: There is no abdominal tenderness.   Lymphadenopathy:      Cervical: No cervical adenopathy.   Skin:     General: Skin is warm and dry.      Coloration: Skin is not pale.      Findings: No erythema.   Neurological:      Mental Status: He is alert and oriented to person, place, and time.   Psychiatric:         Mood and Affect: Mood normal.         Behavior: Behavior normal.         Thought Content: Thought content normal.         Judgment: Judgment normal.       Lab Visit on 06/03/2022   Component Date Value Ref Range Status    Sodium 06/03/2022 141  136 - 145 mmol/L Final    Potassium 06/03/2022 4.8  3.5 - 5.1 mmol/L Final    Chloride 06/03/2022 107  95 - 110 mmol/L Final    CO2 06/03/2022 26  23 - 29 mmol/L Final    Glucose 06/03/2022 92  70 - 110 mg/dL Final    BUN 06/03/2022 13  6 - 20 mg/dL Final    Creatinine 06/03/2022 0.8  0.5 - 1.4 mg/dL Final    Calcium 06/03/2022 9.1  8.7 - 10.5 mg/dL Final    Total Protein 06/03/2022 6.9  6.0 - 8.4 g/dL Final    Albumin 06/03/2022 3.9  3.5 - 5.2 g/dL Final    Total Bilirubin 06/03/2022 0.4  0.1 - 1.0 mg/dL Final    Alkaline Phosphatase 06/03/2022 52 (L)  55 - 135 U/L Final    AST 06/03/2022 14  10 - 40 U/L Final    ALT 06/03/2022 20  10 - 44 U/L Final    Anion Gap 06/03/2022 8  8 - 16 mmol/L Final    eGFR if African American 06/03/2022 >60.0  >60 mL/min/1.73 m^2 Final    eGFR if non African American 06/03/2022 >60.0  >60 mL/min/1.73 m^2 Final     Assessment:       1. Liver lesion    2. Colon cancer screening    3. Chronic low back pain without sciatica, unspecified back pain laterality    4. Status post lumbar spine operation          Plan:     Lab is up-to-date reviewed.  Discuss colon screening.  He prefers Cologuard which is ordered.  Pain management to establish care.  Continue weight reducing diet.  Follow-up 1 year.    Liver lesion    Colon cancer screening  -     Cologuard Screening (Multitarget Stool DNA); Future;  Expected date: 09/22/2022    Chronic low back pain without sciatica, unspecified back pain laterality  -     Ambulatory referral/consult to Pain Clinic; Future; Expected date: 09/29/2022    Status post lumbar spine operation

## 2022-09-23 ENCOUNTER — TELEPHONE (OUTPATIENT)
Dept: PAIN MEDICINE | Facility: CLINIC | Age: 47
End: 2022-09-23
Payer: MEDICARE

## 2022-09-26 ENCOUNTER — TELEPHONE (OUTPATIENT)
Dept: PAIN MEDICINE | Facility: CLINIC | Age: 47
End: 2022-09-26
Payer: MEDICARE

## 2022-11-12 LAB — NONINV COLON CA DNA+OCC BLD SCRN STL QL: NEGATIVE

## 2023-01-31 ENCOUNTER — LAB VISIT (OUTPATIENT)
Dept: LAB | Facility: HOSPITAL | Age: 48
End: 2023-01-31
Attending: INTERNAL MEDICINE
Payer: MEDICARE

## 2023-01-31 ENCOUNTER — OFFICE VISIT (OUTPATIENT)
Dept: HEPATOLOGY | Facility: CLINIC | Age: 48
End: 2023-01-31
Payer: MEDICARE

## 2023-01-31 VITALS
WEIGHT: 262.13 LBS | DIASTOLIC BLOOD PRESSURE: 80 MMHG | HEART RATE: 86 BPM | HEIGHT: 74 IN | SYSTOLIC BLOOD PRESSURE: 126 MMHG | BODY MASS INDEX: 33.64 KG/M2

## 2023-01-31 DIAGNOSIS — R93.2 ABNORMAL LIVER DIAGNOSTIC IMAGING: Primary | ICD-10-CM

## 2023-01-31 DIAGNOSIS — R93.2 ABNORMAL LIVER DIAGNOSTIC IMAGING: ICD-10-CM

## 2023-01-31 PROCEDURE — 1160F RVW MEDS BY RX/DR IN RCRD: CPT | Mod: HCNC,CPTII,S$GLB, | Performed by: INTERNAL MEDICINE

## 2023-01-31 PROCEDURE — 3008F BODY MASS INDEX DOCD: CPT | Mod: HCNC,CPTII,S$GLB, | Performed by: INTERNAL MEDICINE

## 2023-01-31 PROCEDURE — 1160F PR REVIEW ALL MEDS BY PRESCRIBER/CLIN PHARMACIST DOCUMENTED: ICD-10-PCS | Mod: HCNC,CPTII,S$GLB, | Performed by: INTERNAL MEDICINE

## 2023-01-31 PROCEDURE — 3074F SYST BP LT 130 MM HG: CPT | Mod: HCNC,CPTII,S$GLB, | Performed by: INTERNAL MEDICINE

## 2023-01-31 PROCEDURE — 99213 OFFICE O/P EST LOW 20 MIN: CPT | Mod: HCNC,S$GLB,, | Performed by: INTERNAL MEDICINE

## 2023-01-31 PROCEDURE — 99999 PR PBB SHADOW E&M-EST. PATIENT-LVL III: CPT | Mod: PBBFAC,HCNC,, | Performed by: INTERNAL MEDICINE

## 2023-01-31 PROCEDURE — 3074F PR MOST RECENT SYSTOLIC BLOOD PRESSURE < 130 MM HG: ICD-10-PCS | Mod: HCNC,CPTII,S$GLB, | Performed by: INTERNAL MEDICINE

## 2023-01-31 PROCEDURE — 36415 COLL VENOUS BLD VENIPUNCTURE: CPT | Mod: HCNC | Performed by: INTERNAL MEDICINE

## 2023-01-31 PROCEDURE — 1159F PR MEDICATION LIST DOCUMENTED IN MEDICAL RECORD: ICD-10-PCS | Mod: HCNC,CPTII,S$GLB, | Performed by: INTERNAL MEDICINE

## 2023-01-31 PROCEDURE — 3079F PR MOST RECENT DIASTOLIC BLOOD PRESSURE 80-89 MM HG: ICD-10-PCS | Mod: HCNC,CPTII,S$GLB, | Performed by: INTERNAL MEDICINE

## 2023-01-31 PROCEDURE — 99999 PR PBB SHADOW E&M-EST. PATIENT-LVL III: ICD-10-PCS | Mod: PBBFAC,HCNC,, | Performed by: INTERNAL MEDICINE

## 2023-01-31 PROCEDURE — 80053 COMPREHEN METABOLIC PANEL: CPT | Mod: HCNC | Performed by: INTERNAL MEDICINE

## 2023-01-31 PROCEDURE — 99213 PR OFFICE/OUTPT VISIT, EST, LEVL III, 20-29 MIN: ICD-10-PCS | Mod: HCNC,S$GLB,, | Performed by: INTERNAL MEDICINE

## 2023-01-31 PROCEDURE — 3079F DIAST BP 80-89 MM HG: CPT | Mod: HCNC,CPTII,S$GLB, | Performed by: INTERNAL MEDICINE

## 2023-01-31 PROCEDURE — 3008F PR BODY MASS INDEX (BMI) DOCUMENTED: ICD-10-PCS | Mod: HCNC,CPTII,S$GLB, | Performed by: INTERNAL MEDICINE

## 2023-01-31 PROCEDURE — 1159F MED LIST DOCD IN RCRD: CPT | Mod: HCNC,CPTII,S$GLB, | Performed by: INTERNAL MEDICINE
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
## 2023-02-01 LAB
ALBUMIN SERPL BCP-MCNC: 3.9 G/DL (ref 3.5–5.2)
ALP SERPL-CCNC: 61 U/L (ref 55–135)
ALT SERPL W/O P-5'-P-CCNC: 28 U/L (ref 10–44)
ANION GAP SERPL CALC-SCNC: 11 MMOL/L (ref 8–16)
AST SERPL-CCNC: 26 U/L (ref 10–40)
BILIRUB SERPL-MCNC: 0.3 MG/DL (ref 0.1–1)
BUN SERPL-MCNC: 13 MG/DL (ref 6–20)
CALCIUM SERPL-MCNC: 9.2 MG/DL (ref 8.7–10.5)
CHLORIDE SERPL-SCNC: 106 MMOL/L (ref 95–110)
CO2 SERPL-SCNC: 23 MMOL/L (ref 23–29)
CREAT SERPL-MCNC: 1 MG/DL (ref 0.5–1.4)
EST. GFR  (NO RACE VARIABLE): >60 ML/MIN/1.73 M^2
GLUCOSE SERPL-MCNC: 112 MG/DL (ref 70–110)
POTASSIUM SERPL-SCNC: 4 MMOL/L (ref 3.5–5.1)
PROT SERPL-MCNC: 7.2 G/DL (ref 6–8.4)
SODIUM SERPL-SCNC: 140 MMOL/L (ref 136–145)

## 2023-02-07 DIAGNOSIS — Z00.00 ENCOUNTER FOR MEDICARE ANNUAL WELLNESS EXAM: ICD-10-CM

## 2023-02-09 DIAGNOSIS — Z00.00 ENCOUNTER FOR MEDICARE ANNUAL WELLNESS EXAM: ICD-10-CM

## 2023-07-14 ENCOUNTER — TELEPHONE (OUTPATIENT)
Dept: GASTROENTEROLOGY | Facility: CLINIC | Age: 48
End: 2023-07-14
Payer: MEDICARE

## 2023-08-08 ENCOUNTER — PES CALL (OUTPATIENT)
Dept: ADMINISTRATIVE | Facility: CLINIC | Age: 48
End: 2023-08-08
Payer: MEDICARE

## 2023-08-11 ENCOUNTER — LAB VISIT (OUTPATIENT)
Dept: LAB | Facility: HOSPITAL | Age: 48
End: 2023-08-11
Attending: INTERNAL MEDICINE
Payer: MEDICARE

## 2023-08-11 DIAGNOSIS — R93.2 ABNORMAL LIVER DIAGNOSTIC IMAGING: ICD-10-CM

## 2023-08-11 LAB
ALBUMIN SERPL BCP-MCNC: 3.9 G/DL (ref 3.5–5.2)
ALP SERPL-CCNC: 60 U/L (ref 55–135)
ALT SERPL W/O P-5'-P-CCNC: 16 U/L (ref 10–44)
ANION GAP SERPL CALC-SCNC: 12 MMOL/L (ref 8–16)
AST SERPL-CCNC: 24 U/L (ref 10–40)
BILIRUB SERPL-MCNC: 0.6 MG/DL (ref 0.1–1)
BUN SERPL-MCNC: 12 MG/DL (ref 6–20)
CALCIUM SERPL-MCNC: 9.1 MG/DL (ref 8.7–10.5)
CHLORIDE SERPL-SCNC: 106 MMOL/L (ref 95–110)
CO2 SERPL-SCNC: 21 MMOL/L (ref 23–29)
CREAT SERPL-MCNC: 1 MG/DL (ref 0.5–1.4)
EST. GFR  (NO RACE VARIABLE): >60 ML/MIN/1.73 M^2
GLUCOSE SERPL-MCNC: 108 MG/DL (ref 70–110)
POTASSIUM SERPL-SCNC: 4.1 MMOL/L (ref 3.5–5.1)
PROT SERPL-MCNC: 7.5 G/DL (ref 6–8.4)
SODIUM SERPL-SCNC: 139 MMOL/L (ref 136–145)

## 2023-08-11 PROCEDURE — 80053 COMPREHEN METABOLIC PANEL: CPT | Mod: HCNC | Performed by: INTERNAL MEDICINE

## 2023-08-11 PROCEDURE — 36415 COLL VENOUS BLD VENIPUNCTURE: CPT | Mod: HCNC | Performed by: INTERNAL MEDICINE

## 2023-08-15 ENCOUNTER — OFFICE VISIT (OUTPATIENT)
Dept: HEPATOLOGY | Facility: CLINIC | Age: 48
End: 2023-08-15
Payer: MEDICARE

## 2023-08-15 VITALS
HEART RATE: 81 BPM | HEIGHT: 74 IN | BODY MASS INDEX: 31.95 KG/M2 | WEIGHT: 249 LBS | DIASTOLIC BLOOD PRESSURE: 81 MMHG | SYSTOLIC BLOOD PRESSURE: 137 MMHG

## 2023-08-15 DIAGNOSIS — R93.2 ABNORMAL LIVER DIAGNOSTIC IMAGING: Primary | ICD-10-CM

## 2023-08-15 PROCEDURE — 1159F MED LIST DOCD IN RCRD: CPT | Mod: HCNC,CPTII,S$GLB, | Performed by: INTERNAL MEDICINE

## 2023-08-15 PROCEDURE — 99212 PR OFFICE/OUTPT VISIT, EST, LEVL II, 10-19 MIN: ICD-10-PCS | Mod: HCNC,S$GLB,, | Performed by: INTERNAL MEDICINE

## 2023-08-15 PROCEDURE — 99999 PR PBB SHADOW E&M-EST. PATIENT-LVL III: CPT | Mod: PBBFAC,HCNC,, | Performed by: INTERNAL MEDICINE

## 2023-08-15 PROCEDURE — 1159F PR MEDICATION LIST DOCUMENTED IN MEDICAL RECORD: ICD-10-PCS | Mod: HCNC,CPTII,S$GLB, | Performed by: INTERNAL MEDICINE

## 2023-08-15 PROCEDURE — 99999 PR PBB SHADOW E&M-EST. PATIENT-LVL III: ICD-10-PCS | Mod: PBBFAC,HCNC,, | Performed by: INTERNAL MEDICINE

## 2023-08-15 PROCEDURE — 3075F PR MOST RECENT SYSTOLIC BLOOD PRESS GE 130-139MM HG: ICD-10-PCS | Mod: HCNC,CPTII,S$GLB, | Performed by: INTERNAL MEDICINE

## 2023-08-15 PROCEDURE — 3079F PR MOST RECENT DIASTOLIC BLOOD PRESSURE 80-89 MM HG: ICD-10-PCS | Mod: HCNC,CPTII,S$GLB, | Performed by: INTERNAL MEDICINE

## 2023-08-15 PROCEDURE — 3008F BODY MASS INDEX DOCD: CPT | Mod: HCNC,CPTII,S$GLB, | Performed by: INTERNAL MEDICINE

## 2023-08-15 PROCEDURE — 99212 OFFICE O/P EST SF 10 MIN: CPT | Mod: HCNC,S$GLB,, | Performed by: INTERNAL MEDICINE

## 2023-08-15 PROCEDURE — 3079F DIAST BP 80-89 MM HG: CPT | Mod: HCNC,CPTII,S$GLB, | Performed by: INTERNAL MEDICINE

## 2023-08-15 PROCEDURE — 3008F PR BODY MASS INDEX (BMI) DOCUMENTED: ICD-10-PCS | Mod: HCNC,CPTII,S$GLB, | Performed by: INTERNAL MEDICINE

## 2023-08-15 PROCEDURE — 3075F SYST BP GE 130 - 139MM HG: CPT | Mod: HCNC,CPTII,S$GLB, | Performed by: INTERNAL MEDICINE

## 2023-08-15 RX ORDER — OXYCODONE HYDROCHLORIDE 60 MG/1
TABLET, FILM COATED, EXTENDED RELEASE ORAL
COMMUNITY
End: 2023-09-25

## 2023-08-15 RX ORDER — AMITRIPTYLINE HYDROCHLORIDE 10 MG/1
TABLET, FILM COATED ORAL
COMMUNITY
End: 2023-09-25
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
## 2023-09-25 ENCOUNTER — OFFICE VISIT (OUTPATIENT)
Dept: INTERNAL MEDICINE | Facility: CLINIC | Age: 48
End: 2023-09-25
Payer: MEDICARE

## 2023-09-25 ENCOUNTER — LAB VISIT (OUTPATIENT)
Dept: LAB | Facility: HOSPITAL | Age: 48
End: 2023-09-25
Attending: FAMILY MEDICINE
Payer: MEDICARE

## 2023-09-25 VITALS
TEMPERATURE: 99 F | DIASTOLIC BLOOD PRESSURE: 84 MMHG | SYSTOLIC BLOOD PRESSURE: 132 MMHG | HEART RATE: 68 BPM | OXYGEN SATURATION: 97 % | BODY MASS INDEX: 31.88 KG/M2 | WEIGHT: 248.44 LBS | HEIGHT: 74 IN

## 2023-09-25 DIAGNOSIS — E78.1 HYPERTRIGLYCERIDEMIA: ICD-10-CM

## 2023-09-25 DIAGNOSIS — F17.210 CIGARETTE NICOTINE DEPENDENCE WITHOUT COMPLICATION: ICD-10-CM

## 2023-09-25 DIAGNOSIS — Z01.00 ROUTINE EYE EXAM: ICD-10-CM

## 2023-09-25 DIAGNOSIS — Z98.890 STATUS POST LUMBAR SPINE OPERATION: ICD-10-CM

## 2023-09-25 DIAGNOSIS — Z00.00 ANNUAL PHYSICAL EXAM: Primary | ICD-10-CM

## 2023-09-25 DIAGNOSIS — R73.9 HYPERGLYCEMIA: ICD-10-CM

## 2023-09-25 DIAGNOSIS — R79.89 ELEVATED LFTS: ICD-10-CM

## 2023-09-25 DIAGNOSIS — Z28.39 IMMUNIZATION DEFICIENCY: ICD-10-CM

## 2023-09-25 LAB
ALBUMIN SERPL BCP-MCNC: 4 G/DL (ref 3.5–5.2)
ALP SERPL-CCNC: 54 U/L (ref 55–135)
ALT SERPL W/O P-5'-P-CCNC: 20 U/L (ref 10–44)
ANION GAP SERPL CALC-SCNC: 5 MMOL/L (ref 8–16)
AST SERPL-CCNC: 21 U/L (ref 10–40)
BASOPHILS # BLD AUTO: 0.09 K/UL (ref 0–0.2)
BASOPHILS NFR BLD: 1 % (ref 0–1.9)
BILIRUB SERPL-MCNC: 0.4 MG/DL (ref 0.1–1)
BUN SERPL-MCNC: 10 MG/DL (ref 6–20)
CALCIUM SERPL-MCNC: 9.4 MG/DL (ref 8.7–10.5)
CHLORIDE SERPL-SCNC: 108 MMOL/L (ref 95–110)
CHOLEST SERPL-MCNC: 174 MG/DL (ref 120–199)
CHOLEST/HDLC SERPL: 5.3 {RATIO} (ref 2–5)
CO2 SERPL-SCNC: 25 MMOL/L (ref 23–29)
CREAT SERPL-MCNC: 0.9 MG/DL (ref 0.5–1.4)
DIFFERENTIAL METHOD: NORMAL
EOSINOPHIL # BLD AUTO: 0.2 K/UL (ref 0–0.5)
EOSINOPHIL NFR BLD: 2.6 % (ref 0–8)
ERYTHROCYTE [DISTWIDTH] IN BLOOD BY AUTOMATED COUNT: 13 % (ref 11.5–14.5)
EST. GFR  (NO RACE VARIABLE): >60 ML/MIN/1.73 M^2
GLUCOSE SERPL-MCNC: 95 MG/DL (ref 70–110)
HCT VFR BLD AUTO: 49.6 % (ref 40–54)
HDLC SERPL-MCNC: 33 MG/DL (ref 40–75)
HDLC SERPL: 19 % (ref 20–50)
HGB BLD-MCNC: 16.2 G/DL (ref 14–18)
IMM GRANULOCYTES # BLD AUTO: 0.02 K/UL (ref 0–0.04)
IMM GRANULOCYTES NFR BLD AUTO: 0.2 % (ref 0–0.5)
LDLC SERPL CALC-MCNC: 98.4 MG/DL (ref 63–159)
LYMPHOCYTES # BLD AUTO: 3.2 K/UL (ref 1–4.8)
LYMPHOCYTES NFR BLD: 36.2 % (ref 18–48)
MCH RBC QN AUTO: 29.1 PG (ref 27–31)
MCHC RBC AUTO-ENTMCNC: 32.7 G/DL (ref 32–36)
MCV RBC AUTO: 89 FL (ref 82–98)
MONOCYTES # BLD AUTO: 0.6 K/UL (ref 0.3–1)
MONOCYTES NFR BLD: 6.6 % (ref 4–15)
NEUTROPHILS # BLD AUTO: 4.7 K/UL (ref 1.8–7.7)
NEUTROPHILS NFR BLD: 53.4 % (ref 38–73)
NONHDLC SERPL-MCNC: 141 MG/DL
NRBC BLD-RTO: 0 /100 WBC
PLATELET # BLD AUTO: 272 K/UL (ref 150–450)
PMV BLD AUTO: 10.6 FL (ref 9.2–12.9)
POTASSIUM SERPL-SCNC: 4.3 MMOL/L (ref 3.5–5.1)
PROT SERPL-MCNC: 7.5 G/DL (ref 6–8.4)
RBC # BLD AUTO: 5.56 M/UL (ref 4.6–6.2)
SODIUM SERPL-SCNC: 138 MMOL/L (ref 136–145)
TRIGL SERPL-MCNC: 213 MG/DL (ref 30–150)
TSH SERPL DL<=0.005 MIU/L-ACNC: 1.11 UIU/ML (ref 0.4–4)
WBC # BLD AUTO: 8.73 K/UL (ref 3.9–12.7)

## 2023-09-25 PROCEDURE — 99999 PR PBB SHADOW E&M-EST. PATIENT-LVL III: CPT | Mod: PBBFAC,HCNC,, | Performed by: FAMILY MEDICINE

## 2023-09-25 PROCEDURE — 84443 ASSAY THYROID STIM HORMONE: CPT | Mod: HCNC | Performed by: FAMILY MEDICINE

## 2023-09-25 PROCEDURE — 3075F PR MOST RECENT SYSTOLIC BLOOD PRESS GE 130-139MM HG: ICD-10-PCS | Mod: HCNC,CPTII,S$GLB, | Performed by: FAMILY MEDICINE

## 2023-09-25 PROCEDURE — 3079F DIAST BP 80-89 MM HG: CPT | Mod: HCNC,CPTII,S$GLB, | Performed by: FAMILY MEDICINE

## 2023-09-25 PROCEDURE — 3008F BODY MASS INDEX DOCD: CPT | Mod: HCNC,CPTII,S$GLB, | Performed by: FAMILY MEDICINE

## 2023-09-25 PROCEDURE — 36415 COLL VENOUS BLD VENIPUNCTURE: CPT | Mod: HCNC | Performed by: FAMILY MEDICINE

## 2023-09-25 PROCEDURE — 99214 PR OFFICE/OUTPT VISIT, EST, LEVL IV, 30-39 MIN: ICD-10-PCS | Mod: HCNC,S$GLB,, | Performed by: FAMILY MEDICINE

## 2023-09-25 PROCEDURE — 85025 COMPLETE CBC W/AUTO DIFF WBC: CPT | Mod: HCNC | Performed by: FAMILY MEDICINE

## 2023-09-25 PROCEDURE — 3008F PR BODY MASS INDEX (BMI) DOCUMENTED: ICD-10-PCS | Mod: HCNC,CPTII,S$GLB, | Performed by: FAMILY MEDICINE

## 2023-09-25 PROCEDURE — 1159F PR MEDICATION LIST DOCUMENTED IN MEDICAL RECORD: ICD-10-PCS | Mod: HCNC,CPTII,S$GLB, | Performed by: FAMILY MEDICINE

## 2023-09-25 PROCEDURE — 80053 COMPREHEN METABOLIC PANEL: CPT | Mod: HCNC | Performed by: FAMILY MEDICINE

## 2023-09-25 PROCEDURE — 1159F MED LIST DOCD IN RCRD: CPT | Mod: HCNC,CPTII,S$GLB, | Performed by: FAMILY MEDICINE

## 2023-09-25 PROCEDURE — 99999 PR PBB SHADOW E&M-EST. PATIENT-LVL III: ICD-10-PCS | Mod: PBBFAC,HCNC,, | Performed by: FAMILY MEDICINE

## 2023-09-25 PROCEDURE — 99214 OFFICE O/P EST MOD 30 MIN: CPT | Mod: HCNC,S$GLB,, | Performed by: FAMILY MEDICINE

## 2023-09-25 PROCEDURE — 3079F PR MOST RECENT DIASTOLIC BLOOD PRESSURE 80-89 MM HG: ICD-10-PCS | Mod: HCNC,CPTII,S$GLB, | Performed by: FAMILY MEDICINE

## 2023-09-25 PROCEDURE — 3075F SYST BP GE 130 - 139MM HG: CPT | Mod: HCNC,CPTII,S$GLB, | Performed by: FAMILY MEDICINE

## 2023-09-25 PROCEDURE — 80061 LIPID PANEL: CPT | Mod: HCNC | Performed by: FAMILY MEDICINE

## 2023-09-25 NOTE — PROGRESS NOTES
Subjective:       Patient ID: Jean Marie Lester is a 48 y.o. male.    Chief Complaint: Annual Exam    Annual exam.  Up-to-date hepatology consult was reviewed.  Smokes 1/2 pack of cigarettes a day.  Nicotine cessation was discussed.  BMI is 31.90.  Mediterranean diet was discussed.  He has chronic low back pain.  Had back surgery some years ago.  Uses medical marijuana twice a day which is helping his back pain.  He denies headache chest pain palpitations shortness of breath or edema.  He denies obstructive urinary symptoms.  He declined immunizations including Tdap and pneumococcal.  He is having some difficulty in reading.  He would like routine eye exam.      Review of Systems   Constitutional:  Negative for appetite change, fatigue and unexpected weight change.   HENT:  Negative for congestion.    Eyes:  Positive for visual disturbance.   Respiratory:  Negative for cough, chest tightness, shortness of breath and wheezing.    Cardiovascular:  Negative for chest pain, palpitations and leg swelling.   Gastrointestinal:  Negative for abdominal distention, abdominal pain, constipation and diarrhea.   Genitourinary:  Negative for difficulty urinating, dysuria, frequency, hematuria and urgency.   Musculoskeletal:  Positive for back pain.   Neurological:  Positive for dizziness, weakness and light-headedness. Negative for headaches.   Psychiatric/Behavioral:  Negative for dysphoric mood.        Objective:      Physical Exam  Constitutional:       General: He is not in acute distress.     Appearance: He is not ill-appearing or diaphoretic.   HENT:      Right Ear: Tympanic membrane normal.      Left Ear: Tympanic membrane normal.      Nose: Nose normal.   Eyes:      Conjunctiva/sclera: Conjunctivae normal.   Neck:      Comments: Normal thyroid 2 +carotids  Cardiovascular:      Rate and Rhythm: Normal rate and regular rhythm.      Heart sounds: No murmur heard.     No gallop.   Pulmonary:      Effort: Pulmonary effort is  normal. No respiratory distress.      Breath sounds: No wheezing, rhonchi or rales.   Abdominal:      General: There is no distension.      Palpations: Abdomen is soft. There is no mass.      Tenderness: There is no abdominal tenderness.   Lymphadenopathy:      Cervical: No cervical adenopathy.   Skin:     General: Skin is warm and dry.      Coloration: Skin is not pale.      Findings: No erythema.   Neurological:      Mental Status: He is alert.   Psychiatric:         Mood and Affect: Mood normal.         Behavior: Behavior normal.         Lab Visit on 08/11/2023   Component Date Value Ref Range Status    Sodium 08/11/2023 139  136 - 145 mmol/L Final    Potassium 08/11/2023 4.1  3.5 - 5.1 mmol/L Final    Chloride 08/11/2023 106  95 - 110 mmol/L Final    CO2 08/11/2023 21 (L)  23 - 29 mmol/L Final    Glucose 08/11/2023 108  70 - 110 mg/dL Final    BUN 08/11/2023 12  6 - 20 mg/dL Final    Creatinine 08/11/2023 1.0  0.5 - 1.4 mg/dL Final    Calcium 08/11/2023 9.1  8.7 - 10.5 mg/dL Final    Total Protein 08/11/2023 7.5  6.0 - 8.4 g/dL Final    Albumin 08/11/2023 3.9  3.5 - 5.2 g/dL Final    Total Bilirubin 08/11/2023 0.6  0.1 - 1.0 mg/dL Final    Alkaline Phosphatase 08/11/2023 60  55 - 135 U/L Final    AST 08/11/2023 24  10 - 40 U/L Final    ALT 08/11/2023 16  10 - 44 U/L Final    eGFR 08/11/2023 >60.0  >60 mL/min/1.73 m^2 Final    Anion Gap 08/11/2023 12  8 - 16 mmol/L Final     Assessment:       1. Annual physical exam    2. Cigarette nicotine dependence without complication    3. Hypertriglyceridemia    4. Status post lumbar spine operation    5. BMI 31.0-31.9,adult    6. Immunization deficiency        Plan:   Lab was ordered.  He declined immunizations.  In the past his blood sugar was elevated as well as his triglyceride.  Diet was discussed.  Follow-up in 1 year nicotine cessation was discussed      Annual physical exam    Cigarette nicotine dependence without complication    Hypertriglyceridemia    Status post  lumbar spine operation    BMI 31.0-31.9,adult    Immunization deficiency

## 2023-11-01 ENCOUNTER — TELEPHONE (OUTPATIENT)
Dept: OPHTHALMOLOGY | Facility: CLINIC | Age: 48
End: 2023-11-01
Payer: MEDICARE

## 2023-11-01 NOTE — TELEPHONE ENCOUNTER
----- Message from Elenita Linares sent at 11/1/2023  2:30 PM CDT -----  Contact: [phone]    Patient is returning a phone call.-Jean Marie Lester-     Who left a message for the patient: - Sarah Garcia-     Does patient know what this is regarding: -Returning nurse call-     Would you like a call back, or a response through your [facility] portal?:- Call back-     Comments: Please call the patient back to advise.

## 2023-11-01 NOTE — TELEPHONE ENCOUNTER
Called pt at 2:40pm. Pt wanted to r/s his appt to the beginning of next year due to other appts and holiday travel. R/S pt for the 2nd week in Jan.    SRS

## 2023-11-01 NOTE — TELEPHONE ENCOUNTER
----- Message from Ana Maria Grey sent at 11/1/2023  1:38 PM CDT -----  Contact: GEETHA GRAVES [[id number]]  ..Type:  Patient Requesting Call    Who Called: GEETHA GRAVES [[phone]]  Does the patient know what this is regarding?: rescheduling 11/2/23  Would the patient rather a call back or a response via [employer]? call  Best Call Back Number: .[phone] (home)     Additional Information:

## 2023-11-01 NOTE — TELEPHONE ENCOUNTER
Called pt at 2:00pm no answer lvm to call the main number if he needs to r/s his appt for tomorrow.    SRS

## 2023-12-25 PROBLEM — Z00.00 ANNUAL PHYSICAL EXAM: Status: RESOLVED | Noted: 2021-11-01 | Resolved: 2023-12-25

## 2024-06-22 ENCOUNTER — PATIENT MESSAGE (OUTPATIENT)
Dept: ADMINISTRATIVE | Facility: CLINIC | Age: 49
End: 2024-06-22
Payer: MEDICARE

## 2024-06-25 ENCOUNTER — OFFICE VISIT (OUTPATIENT)
Dept: INTERNAL MEDICINE | Facility: CLINIC | Age: 49
End: 2024-06-25
Payer: MEDICARE

## 2024-06-25 VITALS — WEIGHT: 233 LBS | HEIGHT: 74 IN | BODY MASS INDEX: 29.9 KG/M2

## 2024-06-25 DIAGNOSIS — F17.210 CIGARETTE NICOTINE DEPENDENCE WITHOUT COMPLICATION: ICD-10-CM

## 2024-06-25 DIAGNOSIS — E78.1 HYPERTRIGLYCERIDEMIA: ICD-10-CM

## 2024-06-25 DIAGNOSIS — Z00.00 ENCOUNTER FOR PREVENTIVE HEALTH EXAMINATION: Primary | ICD-10-CM

## 2024-06-25 DIAGNOSIS — G89.29 CHRONIC LOW BACK PAIN WITHOUT SCIATICA, UNSPECIFIED BACK PAIN LATERALITY: ICD-10-CM

## 2024-06-25 DIAGNOSIS — Z00.00 ENCOUNTER FOR MEDICARE ANNUAL WELLNESS EXAM: ICD-10-CM

## 2024-06-25 DIAGNOSIS — Z98.890 STATUS POST LUMBAR SPINE OPERATION: ICD-10-CM

## 2024-06-25 DIAGNOSIS — M54.50 CHRONIC LOW BACK PAIN WITHOUT SCIATICA, UNSPECIFIED BACK PAIN LATERALITY: ICD-10-CM

## 2024-06-25 PROCEDURE — G0439 PPPS, SUBSEQ VISIT: HCPCS | Mod: HCNC,95,, | Performed by: NURSE PRACTITIONER

## 2024-06-25 PROCEDURE — G9919 SCRN ND POS ND PROV OF REC: HCPCS | Mod: HCNC,CPTII,NDTC, | Performed by: NURSE PRACTITIONER

## 2024-06-25 PROCEDURE — 1159F MED LIST DOCD IN RCRD: CPT | Mod: HCNC,CPTII,95, | Performed by: NURSE PRACTITIONER

## 2024-06-25 PROCEDURE — 1160F RVW MEDS BY RX/DR IN RCRD: CPT | Mod: HCNC,CPTII,95, | Performed by: NURSE PRACTITIONER

## 2024-06-25 NOTE — PATIENT INSTRUCTIONS
Counseling and Referral of Other Preventative  (Italic type indicates deductible and co-insurance are waived)    Patient Name: Jean Marie Lester  Today's Date: 6/25/2024    Health Maintenance       Date Due Completion Date    Pneumococcal Vaccines (Age 0-64) (1 of 2 - PCV) Never done ---    TETANUS VACCINE Never done ---    COVID-19 Vaccine (4 - 2023-24 season) 09/01/2023 12/7/2021    HIV Screening 11/01/2027 (Originally 7/20/1990) ---    Hemoglobin A1c (Diabetic Prevention Screening) 03/02/2025 3/2/2022    Colorectal Cancer Screening 11/06/2025 11/6/2022    Lipid Panel 09/25/2028 9/25/2023        No orders of the defined types were placed in this encounter.      The following information is provided to all patients.  This information is to help you find resources for any of the problems found today that may be affecting your health:                  Living healthy guide: www.[facility].[place].[facility]      Understanding Diabetes: www.diabetes.org      Eating healthy: www.cdc.gov/healthyweight      CDC home safety checklist: www.cdc.gov/steadi/patient.html      Agency on Aging: www.goea.[place].[facility]      Alcoholics anonymous (AA): www.aa.org      Physical Activity: www.dinah.nih.gov/su5mfyo      Tobacco use: www.quitwithusla.org

## 2024-06-25 NOTE — PROGRESS NOTES
The patient location is: [place]  The chief complaint leading to consultation is:  Medicare AWV    Visit type: audiovisual    Face to Face time with patient:  30 min   45  minutes of total time spent on the encounter, which includes face to face time and non-face to face time preparing to see the patient (eg, review of tests), Obtaining and/or reviewing separately obtained history, Documenting clinical information in the electronic or other health record, Independently interpreting results (not separately reported) and communicating results to the patient/family/caregiver, or Care coordination (not separately reported).         Each patient to whom he or she provides medical services by telemedicine is:  (1) informed of the relationship between the physician and patient and the respective role of any other health care provider with respect to management of the patient; and (2) notified that he or she may decline to receive medical services by telemedicine and may withdraw from such care at any time.    Notes:       Jean Marie Lester presented for a  Medicare AWV and comprehensive Health Risk Assessment today. The following components were reviewed and updated:    Medical history  Family History  Social history  Allergies and Current Medications  Health Risk Assessment  Health Maintenance  Care Team     Patient screened moderate and/or high risk for one or more social determinants of health (SDOH). Patient connected to community resources through the ED Navigator.      ** See Completed Assessments for Annual Wellness Visit within the encounter summary.**         The following assessments were completed:  Living Situation  CAGE  Depression Screening  Fall Risk Assessment (MACH 10)  Hearing Assessment(HHI)  Cognitive Function Screening  Nutrition Screening  ADL Screening  PAQ Screening    Opioid documentation:      Patient does not have a current opioid prescription.        Vitals:    06/25/24 1003   Weight: 105.7 kg  "(233 lb)   Height: 6' 2" (1.88 m)     Body mass index is 29.92 kg/m².  Physical Exam  Constitutional:       General: He is not in acute distress.     Appearance: Normal appearance. He is not ill-appearing, toxic-appearing or diaphoretic.   Pulmonary:      Effort: Pulmonary effort is normal.   Neurological:      Mental Status: He is alert and oriented to person, place, and time.   Psychiatric:         Mood and Affect: Mood normal.         Behavior: Behavior normal.               Diagnoses and health risks identified today and associated recommendations/orders:    1. Encounter for preventive health examination  Screenings performed, as noted above.  Personal preventative testing needs reviewed.      2. Encounter for Medicare annual wellness exam  Screenings performed, as noted above.  Personal preventative testing needs reviewed.    - Ambulatory Referral/Consult to Enhanced Annual Wellness Visit (eAWV)    3. Hypertriglyceridemia  Monitored, elevated, discussed diet with pcp, states he is improving his diet, follow up as indicated    4. Chronic low back pain without sciatica, unspecified back pain laterality  Treated with medical marijuana, stable cont tx, exercise to tolerance    5. Cigarette nicotine dependence without complication  Monitored, unstable, still smoking 1/2 ppd, enc cessation, declines classes at this time    6. Status post lumbar spine operation  Monitored, stble follow up with pcp    Discussed vaccines      Provided Jean Marie with a 5-10 year written screening schedule and personal prevention plan. Recommendations were developed using the USPSTF age appropriate recommendations. Education, counseling, and referrals were provided as needed. After Visit Summary printed and given to patient which includes a list of additional screenings\tests needed.    No follow-ups on file.    Mahendra Bowden NP  I offered to discuss advanced care planning, including how to pick a person who would make decisions for you if " you were unable to make them for yourself, called a health care power of , and what kind of decisions you might make such as use of life sustaining treatments such as ventilators and tube feeding when faced with a life limiting illness recorded on a living will that they will need to know. (How you want to be cared for as you near the end of your natural life)     X Patient is interested in learning more about how to make advanced directives.  I provided them paperwork and offered to discuss this with them.

## 2024-09-26 ENCOUNTER — OFFICE VISIT (OUTPATIENT)
Dept: INTERNAL MEDICINE | Facility: CLINIC | Age: 49
End: 2024-09-26
Payer: MEDICARE

## 2024-09-26 VITALS
DIASTOLIC BLOOD PRESSURE: 80 MMHG | BODY MASS INDEX: 31.18 KG/M2 | SYSTOLIC BLOOD PRESSURE: 132 MMHG | OXYGEN SATURATION: 98 % | TEMPERATURE: 97 F | HEIGHT: 74 IN | WEIGHT: 242.94 LBS | HEART RATE: 80 BPM

## 2024-09-26 DIAGNOSIS — G89.29 CHRONIC LOW BACK PAIN WITHOUT SCIATICA, UNSPECIFIED BACK PAIN LATERALITY: ICD-10-CM

## 2024-09-26 DIAGNOSIS — E78.1 PURE HYPERTRIGLYCERIDEMIA: ICD-10-CM

## 2024-09-26 DIAGNOSIS — Z00.00 ANNUAL PHYSICAL EXAM: Primary | ICD-10-CM

## 2024-09-26 DIAGNOSIS — M54.50 CHRONIC LOW BACK PAIN WITHOUT SCIATICA, UNSPECIFIED BACK PAIN LATERALITY: ICD-10-CM

## 2024-09-26 DIAGNOSIS — Z01.00 ROUTINE EYE EXAM: ICD-10-CM

## 2024-09-26 DIAGNOSIS — F17.210 CIGARETTE NICOTINE DEPENDENCE WITHOUT COMPLICATION: ICD-10-CM

## 2024-09-26 DIAGNOSIS — Z98.890 STATUS POST LUMBAR SPINE OPERATION: ICD-10-CM

## 2024-09-26 DIAGNOSIS — Z28.39 IMMUNIZATION DEFICIENCY: ICD-10-CM

## 2024-09-26 PROCEDURE — 99999 PR PBB SHADOW E&M-EST. PATIENT-LVL III: CPT | Mod: PBBFAC,HCNC,, | Performed by: FAMILY MEDICINE

## 2024-09-26 NOTE — PROGRESS NOTES
Patient ID: Jean Marie Lester is a 49 y.o. male.    Chief Complaint: Annual Exam    History of Present Illness    Mr. Lester presents today for an annual exam.    He reports increased family responsibilities due to health concerns of multiple family members. His father (72) requires assistance with transportation, his grandmother (91) recently underwent a hip replacement, and his mother (70s) also requires attention. He expresses feeling overwhelmed by frequent requests for help from family members, impacting his ability to rest.    He currently smokes between half a pack to a pack of cigarettes per day, acknowledging smoking less than before but has not successfully quit. Recent dental work has increased his consciousness about smoking habits. He is actively involved in helping his family with various tasks.    He has a history of back surgery, specifically a two-level titanium disc replacement in the lower back performed by Dr. Weathers in 2004. Recent imaging has shown a slight shift in the disc.    He reports recent weight fluctuations, having lost about 10 lbs but since regained 4 lbs. He is actively working on weight management.    He has received three COVID-19 vaccinations but has not received the new COVID-19 vaccine. He reports multiple tetanus vaccines in the past, with the most recent one being within the last 10 years, though unsure of the exact date. He expresses no interest in receiving the pneumonia vaccine recommended for smokers.    He reports difficulty reading items up close and requires reading glasses for near vision tasks. His distance vision remains unaffected. He acknowledges that his vision difficulties are progressively worsening. He missed a previously scheduled eye appointment and expresses interest in rescheduling.    He reports previous elevated triglyceride levels. He acknowledges the need for fasting before lab work to accurately assess sugar and triglyceride levels. He agrees to  return for labs after fasting for 8 hours, consuming only water during that time.      ROS:  General: -fever, -chills, -fatigue, -weight gain, +weight loss  Eyes: +vision changes, -redness, -discharge  ENT: -ear pain, -nasal congestion, -sore throat  Cardiovascular: -chest pain, -palpitations, -lower extremity edema  Respiratory: -cough, -shortness of breath  Gastrointestinal: -abdominal pain, -nausea, -vomiting, -diarrhea, -constipation, -blood in stool  Genitourinary: -dysuria, -hematuria, -frequency  Musculoskeletal: -joint pain, -muscle pain chronic intermittent low back pain stable  Skin: -rash, -lesion  Neurological: -headache, -dizziness, -numbness, -tingling         Physical Exam    General: In no acute distress. Not ill-appearing or diaphoretic.  Neck: Normal thyroid. No cervical lymphadenopathy. 2+ carotid pulses.  Cardiovascular: Normal rate and regular  rhythm. No murmur heard. No gallop.  Pulmonary: Pulmonary effort is normal. No respiratory distress. No wheezing. No rhonchi. No rales.  Abdominal: Soft. Nontender. Nondistended. No mass.  Musculoskeletal: No swelling. No deformity. Normal leg movement.  Straight leg raise is neg  Neurological: Alert.  Psychiatric: Mood normal. Behavior normal.         Assessment & Plan    SMOKING CESSATION:  - Considered pneumonia vaccine for patient who smokes cigarettes.  - Mr. Lester to continue efforts to quit smoking.    BACK CONDITION:  - Assessed back condition, noting patient's history of 2-level titanium disc replacement in 2004.    VISION CONCERNS:  - Evaluated patient's vision concerns, particularly difficulty reading close-up text.  - Referred to eye exam for vision assessment, particularly for reading difficulties.    IMMUNIZATIONS:  - Reviewed patient's immunization status, including COVID-19 and tetanus.  - Informed patient about new COVID-19 vaccine availability at pharmacies.    HYPERLIPIDEMIA:  - Determined need for fasting lab work due to previously  elevated triglycerides.  - Ordered fasting lab work including blood sugar and triglycerides.    LABS:  - Explained importance of fasting for at least 8 hours before lab work to accurately measure sugar and other levels.  - Ordered CBC (Complete Blood Count).  - Follow up for lab work any day within the next 3 months after fasting for 8 hours.  - Mr. Lester to inform lab staff of fasting status upon arrival for labs.    WEIGHT MANAGEMENT:  - Mr. Lester to maintain weight loss efforts.          1. Annual physical exam  Comprehensive Metabolic Panel    Urinalysis      2. Routine eye exam  Ambulatory referral/consult to Optometry      3. Pure hypertriglyceridemia  Lipid Panel    TSH      4. Cigarette nicotine dependence without complication  CBC Auto Differential      5. BMI 31.0-31.9,adult        6. Chronic low back pain without sciatica, unspecified back pain laterality        7. Status post lumbar spine operation        8. Immunization deficiency                No follow-ups on file.    This note was generated with the assistance of ambient listening technology. Verbal consent was obtained by the patient and accompanying visitor(s) for the recording of patient appointment to facilitate this note. I attest to having reviewed and edited the generated note for accuracy, though some syntax or spelling errors may persist. Please contact the author of this note for any clarification.

## 2024-11-14 ENCOUNTER — LAB VISIT (OUTPATIENT)
Dept: LAB | Facility: HOSPITAL | Age: 49
End: 2024-11-14
Attending: FAMILY MEDICINE
Payer: MEDICARE

## 2024-11-14 ENCOUNTER — OFFICE VISIT (OUTPATIENT)
Dept: OPHTHALMOLOGY | Facility: CLINIC | Age: 49
End: 2024-11-14
Payer: MEDICARE

## 2024-11-14 DIAGNOSIS — Z01.00 ROUTINE EYE EXAM: ICD-10-CM

## 2024-11-14 DIAGNOSIS — Z00.00 ANNUAL PHYSICAL EXAM: ICD-10-CM

## 2024-11-14 DIAGNOSIS — F17.210 CIGARETTE NICOTINE DEPENDENCE WITHOUT COMPLICATION: ICD-10-CM

## 2024-11-14 DIAGNOSIS — E78.1 PURE HYPERTRIGLYCERIDEMIA: ICD-10-CM

## 2024-11-14 DIAGNOSIS — H52.7 REFRACTIVE ERRORS: Primary | ICD-10-CM

## 2024-11-14 LAB
ALBUMIN SERPL BCP-MCNC: 3.9 G/DL (ref 3.5–5.2)
ALP SERPL-CCNC: 53 U/L (ref 40–150)
ALT SERPL W/O P-5'-P-CCNC: 26 U/L (ref 10–44)
ANION GAP SERPL CALC-SCNC: 8 MMOL/L (ref 8–16)
AST SERPL-CCNC: 16 U/L (ref 10–40)
BASOPHILS # BLD AUTO: 0.06 K/UL (ref 0–0.2)
BASOPHILS NFR BLD: 0.6 % (ref 0–1.9)
BILIRUB SERPL-MCNC: 0.6 MG/DL (ref 0.1–1)
BUN SERPL-MCNC: 13 MG/DL (ref 6–20)
CALCIUM SERPL-MCNC: 9.3 MG/DL (ref 8.7–10.5)
CHLORIDE SERPL-SCNC: 106 MMOL/L (ref 95–110)
CHOLEST SERPL-MCNC: 178 MG/DL (ref 120–199)
CHOLEST/HDLC SERPL: 4.8 {RATIO} (ref 2–5)
CO2 SERPL-SCNC: 22 MMOL/L (ref 23–29)
CREAT SERPL-MCNC: 1 MG/DL (ref 0.5–1.4)
DIFFERENTIAL METHOD BLD: ABNORMAL
EOSINOPHIL # BLD AUTO: 0.1 K/UL (ref 0–0.5)
EOSINOPHIL NFR BLD: 1.3 % (ref 0–8)
ERYTHROCYTE [DISTWIDTH] IN BLOOD BY AUTOMATED COUNT: 13.5 % (ref 11.5–14.5)
EST. GFR  (NO RACE VARIABLE): >60 ML/MIN/1.73 M^2
GLUCOSE SERPL-MCNC: 112 MG/DL (ref 70–110)
HCT VFR BLD AUTO: 52.2 % (ref 40–54)
HDLC SERPL-MCNC: 37 MG/DL (ref 40–75)
HDLC SERPL: 20.8 % (ref 20–50)
HGB BLD-MCNC: 16.8 G/DL (ref 14–18)
IMM GRANULOCYTES # BLD AUTO: 0.05 K/UL (ref 0–0.04)
IMM GRANULOCYTES NFR BLD AUTO: 0.5 % (ref 0–0.5)
LDLC SERPL CALC-MCNC: 100 MG/DL (ref 63–159)
LYMPHOCYTES # BLD AUTO: 3.9 K/UL (ref 1–4.8)
LYMPHOCYTES NFR BLD: 37.5 % (ref 18–48)
MCH RBC QN AUTO: 28.8 PG (ref 27–31)
MCHC RBC AUTO-ENTMCNC: 32.2 G/DL (ref 32–36)
MCV RBC AUTO: 89 FL (ref 82–98)
MONOCYTES # BLD AUTO: 0.8 K/UL (ref 0.3–1)
MONOCYTES NFR BLD: 7.2 % (ref 4–15)
NEUTROPHILS # BLD AUTO: 5.5 K/UL (ref 1.8–7.7)
NEUTROPHILS NFR BLD: 52.9 % (ref 38–73)
NONHDLC SERPL-MCNC: 141 MG/DL
NRBC BLD-RTO: 0 /100 WBC
PLATELET # BLD AUTO: 237 K/UL (ref 150–450)
PMV BLD AUTO: 10.9 FL (ref 9.2–12.9)
POTASSIUM SERPL-SCNC: 4.6 MMOL/L (ref 3.5–5.1)
PROT SERPL-MCNC: 7.4 G/DL (ref 6–8.4)
RBC # BLD AUTO: 5.84 M/UL (ref 4.6–6.2)
SODIUM SERPL-SCNC: 136 MMOL/L (ref 136–145)
TRIGL SERPL-MCNC: 205 MG/DL (ref 30–150)
TSH SERPL DL<=0.005 MIU/L-ACNC: 2.53 UIU/ML (ref 0.4–4)
WBC # BLD AUTO: 10.4 K/UL (ref 3.9–12.7)

## 2024-11-14 PROCEDURE — 92015 DETERMINE REFRACTIVE STATE: CPT | Mod: HCNC,S$GLB,, | Performed by: OPTOMETRIST

## 2024-11-14 PROCEDURE — 92004 COMPRE OPH EXAM NEW PT 1/>: CPT | Mod: HCNC,S$GLB,, | Performed by: OPTOMETRIST

## 2024-11-14 PROCEDURE — 36415 COLL VENOUS BLD VENIPUNCTURE: CPT | Mod: HCNC | Performed by: FAMILY MEDICINE

## 2024-11-14 PROCEDURE — 81003 URINALYSIS AUTO W/O SCOPE: CPT | Mod: HCNC | Performed by: FAMILY MEDICINE

## 2024-11-14 PROCEDURE — 99999 PR PBB SHADOW E&M-EST. PATIENT-LVL II: CPT | Mod: PBBFAC,HCNC,, | Performed by: OPTOMETRIST

## 2024-11-14 PROCEDURE — 80061 LIPID PANEL: CPT | Mod: HCNC | Performed by: FAMILY MEDICINE

## 2024-11-14 PROCEDURE — 1159F MED LIST DOCD IN RCRD: CPT | Mod: HCNC,CPTII,S$GLB, | Performed by: OPTOMETRIST

## 2024-11-14 PROCEDURE — 84443 ASSAY THYROID STIM HORMONE: CPT | Mod: HCNC | Performed by: FAMILY MEDICINE

## 2024-11-14 PROCEDURE — 85025 COMPLETE CBC W/AUTO DIFF WBC: CPT | Mod: HCNC | Performed by: FAMILY MEDICINE

## 2024-11-14 PROCEDURE — 80053 COMPREHEN METABOLIC PANEL: CPT | Mod: HCNC | Performed by: FAMILY MEDICINE

## 2024-11-14 NOTE — PROGRESS NOTES
SUBJECTIVE  Jean Marie Lester is 49 y.o. male  Uncorrected distance visual acuity was 20/20 in the right eye and 20/20 in the left eye. Corrected near visual acuity was J1 in the right eye and J1 in the left eye. Comments: Checked near vision with otc readers..   Chief Complaint   Patient presents with    Annual Exam          HPI    New Patient   First eye exam     Patient notice a change with near vision.  No ocular pain/discomfort.  Use Clear Eyes prn.  Wear otc readers +1.50.  Last edited by Kathryn Lazaro on 11/14/2024 11:03 AM.         Assessment /Plan :  1. Routine eye exam  - Ambulatory referral/consult to Optometry  No pathology.     2. Refractive errors  Dispense Final Rx for glasses.  RTC 1 year  Discussed above and answered questions.

## 2024-11-15 LAB
BILIRUB UR QL STRIP: NEGATIVE
CLARITY UR REFRACT.AUTO: CLEAR
COLOR UR AUTO: YELLOW
GLUCOSE UR QL STRIP: NEGATIVE
HGB UR QL STRIP: NEGATIVE
KETONES UR QL STRIP: NEGATIVE
LEUKOCYTE ESTERASE UR QL STRIP: NEGATIVE
NITRITE UR QL STRIP: NEGATIVE
PH UR STRIP: 5 [PH] (ref 5–8)
PROT UR QL STRIP: NEGATIVE
SP GR UR STRIP: 1.01 (ref 1–1.03)
URN SPEC COLLECT METH UR: NORMAL

## 2025-06-11 ENCOUNTER — OFFICE VISIT (OUTPATIENT)
Dept: INTERNAL MEDICINE | Facility: CLINIC | Age: 50
End: 2025-06-11
Payer: MEDICARE

## 2025-06-11 VITALS — WEIGHT: 240 LBS | BODY MASS INDEX: 30.8 KG/M2 | HEIGHT: 74 IN

## 2025-06-11 DIAGNOSIS — Z00.00 ENCOUNTER FOR MEDICARE ANNUAL WELLNESS EXAM: Primary | ICD-10-CM

## 2025-06-11 DIAGNOSIS — M54.50 CHRONIC LOW BACK PAIN WITHOUT SCIATICA, UNSPECIFIED BACK PAIN LATERALITY: ICD-10-CM

## 2025-06-11 DIAGNOSIS — G89.29 CHRONIC LOW BACK PAIN WITHOUT SCIATICA, UNSPECIFIED BACK PAIN LATERALITY: ICD-10-CM

## 2025-06-11 DIAGNOSIS — E78.1 PURE HYPERTRIGLYCERIDEMIA: ICD-10-CM

## 2025-06-11 DIAGNOSIS — F17.210 CIGARETTE NICOTINE DEPENDENCE WITHOUT COMPLICATION: ICD-10-CM

## 2025-06-11 DIAGNOSIS — R26.9 ABNORMALITY OF GAIT AND MOBILITY: ICD-10-CM

## 2025-06-11 DIAGNOSIS — Z12.11 SCREEN FOR COLON CANCER: ICD-10-CM

## 2025-06-11 PROBLEM — Z01.00 ROUTINE EYE EXAM: Status: RESOLVED | Noted: 2024-09-26 | Resolved: 2025-06-11

## 2025-06-11 NOTE — PATIENT INSTRUCTIONS
Counseling and Referral of Other Preventative  (Italic type indicates deductible and co-insurance are waived)    Patient Name: Jean Marie Lester  Today's Date: 6/11/2025    Health Maintenance       Date Due Completion Date    TETANUS VACCINE Never done ---    COVID-19 Vaccine (4 - 2024-25 season) 09/01/2024 12/7/2021    Hemoglobin A1c (Diabetic Prevention Screening) 03/02/2025 3/2/2022    Pneumococcal Vaccines (Age 0-49) (1 of 2 - PCV) 09/26/2025 (Originally 7/20/1995) ---    HIV Screening 11/01/2027 (Originally 7/20/1991) ---    Colorectal Cancer Screening 11/06/2025 11/6/2022    Lipid Panel 11/14/2029 11/14/2024    RSV Vaccine (Age 60+ and Pregnant patients) (1 - 1-dose 75+ series) 07/20/2051 ---        Orders Placed This Encounter   Procedures    Cologuard Screening (Multitarget Stool DNA)       The following information is provided to all patients.  This information is to help you find resources for any of the problems found today that may be affecting your health:                  Living healthy guide: www.[facility].[place].gov      Understanding Diabetes: www.diabetes.org      Eating healthy: www.cdc.gov/healthyweight      CDC home safety checklist: www.cdc.gov/steadi/patient.html      Agency on Aging: www.goea.[place].[facility]      Alcoholics anonymous (AA): www.aa.org      Physical Activity: www.dinah.nih.gov/pz0djtc      Tobacco use: www.quitwithusla.org

## 2025-06-11 NOTE — PROGRESS NOTES
The patient location is: [place]  The chief complaint leading to consultation is:  Medicare AWV    Visit type: audiovisual    Face to Face time with patient: 25 min  40 minutes of total time spent on the encounter, which includes face to face time and non-face to face time preparing to see the patient (eg, review of tests), Obtaining and/or reviewing separately obtained history, Documenting clinical information in the electronic or other health record, Independently interpreting results (not separately reported) and communicating results to the patient/family/caregiver, or Care coordination (not separately reported).         Each patient to whom he or she provides medical services by telemedicine is:  (1) informed of the relationship between the physician and patient and the respective role of any other health care provider with respect to management of the patient; and (2) notified that he or she may decline to receive medical services by telemedicine and may withdraw from such care at any time.    Notes:                         Jean Marie Lester presented for a  Medicare AWV and comprehensive Health Risk Assessment today. The following components were reviewed and updated:    Medical history  Family History  Social history  Allergies and Current Medications  Health Risk Assessment  Health Maintenance  Care Team     Patient screened moderate and/or high risk for one or more social determinants of health (SDOH). Patient connected to community resources through the ED Navigator.      ** See Completed Assessments for Annual Wellness Visit within the encounter summary.**         The following assessments were completed:  Living Situation  CAGE  Depression Screening  Fall Risk Assessment (MACH 10)  Hearing Assessment(HHI)  Cognitive Function Screening  Nutrition Screening  ADL Screening  PAQ Screening  Has urine leakage ever interrupted your daily activites or sleep? No  Do you think you could use some help to better  "manage urine leakage?No     Opioid documentation:      Patient does not have a current opioid prescription.        Vitals:    06/11/25 1129   Weight: 108.9 kg (240 lb)   Height: 6' 2" (1.88 m)     Body mass index is 30.81 kg/m².  Physical Exam  Constitutional:       Appearance: Normal appearance.   Pulmonary:      Effort: Pulmonary effort is normal.   Neurological:      Mental Status: He is alert and oriented to person, place, and time.   Psychiatric:         Mood and Affect: Mood normal.         Behavior: Behavior normal.               Diagnoses and health risks identified today and associated recommendations/orders:    1. Encounter for Medicare annual wellness exam  Screenings performed, as noted above.  Personal preventative testing needs reviewed.      2. Screen for colon cancer  Due for screening, will send kit  - Cologuard Screening (Multitarget Stool DNA); Future  - Cologuard Screening (Multitarget Stool DNA)    3. Abnormality of gait and mobility  Monitored, stable, denies having to use a cane or walker since back sx and sciatica is improved    4. Pure hypertriglyceridemia  Monitored, stable, heart healthy diet    5. Chronic low back pain without sciatica, unspecified back pain laterality  Treated with med marijuana, stable, follow up with pcp    6. Cigarette nicotine dependence without complication  Assessed, unstable, continues to smoke, declines smoking cessation classes, states is trying to cut back, discussed other alternative tx    Discussed vaccines      Provided Jean Marie with a 5-10 year written screening schedule and personal prevention plan. Recommendations were developed using the USPSTF age appropriate recommendations. Education, counseling, and referrals were provided as needed. After Visit Summary printed and given to patient which includes a list of additional screenings\tests needed.    No follow-ups on file.    Mahendra Bowden NP  I offered to discuss advanced care planning, including how to " pick a person who would make decisions for you if you were unable to make them for yourself, called a health care power of , and what kind of decisions you might make such as use of life sustaining treatments such as ventilators and tube feeding when faced with a life limiting illness recorded on a living will that they will need to know. (How you want to be cared for as you near the end of your natural life)     X Patient is interested in learning more about how to make advanced directives.  I provided them paperwork and offered to discuss this with them.

## 2025-07-18 ENCOUNTER — PATIENT OUTREACH (OUTPATIENT)
Dept: ADMINISTRATIVE | Facility: HOSPITAL | Age: 50
End: 2025-07-18
Payer: MEDICARE

## 2025-07-18 NOTE — PROGRESS NOTES
Working BR COLON REPORT: Chart searched, Pt is due for PCP annual visit, Colorectal screening: Next due 11/2025 (Cologuard). Attempted to contact patient, no answer, left voicemail.